# Patient Record
Sex: FEMALE | Race: WHITE | NOT HISPANIC OR LATINO | Employment: FULL TIME | URBAN - METROPOLITAN AREA
[De-identification: names, ages, dates, MRNs, and addresses within clinical notes are randomized per-mention and may not be internally consistent; named-entity substitution may affect disease eponyms.]

---

## 2018-12-05 ENCOUNTER — RECORDS - HEALTHEAST (OUTPATIENT)
Dept: LAB | Facility: HOSPITAL | Age: 58
End: 2018-12-05

## 2018-12-05 LAB
ANION GAP SERPL CALCULATED.3IONS-SCNC: 10 MMOL/L (ref 5–18)
BUN SERPL-MCNC: 26 MG/DL (ref 8–22)
CALCIUM SERPL-MCNC: 9.4 MG/DL (ref 8.5–10.5)
CHLORIDE BLD-SCNC: 107 MMOL/L (ref 98–107)
CO2 SERPL-SCNC: 25 MMOL/L (ref 22–31)
CREAT SERPL-MCNC: 1.2 MG/DL (ref 0.6–1.1)
GFR SERPL CREATININE-BSD FRML MDRD: 46 ML/MIN/1.73M2
GLUCOSE BLD-MCNC: 73 MG/DL (ref 70–125)
POTASSIUM BLD-SCNC: 3.7 MMOL/L (ref 3.5–5)
SODIUM SERPL-SCNC: 142 MMOL/L (ref 136–145)

## 2021-04-18 DIAGNOSIS — G43.009 MIGRAINE WITHOUT AURA AND RESPONSIVE TO TREATMENT: Primary | ICD-10-CM

## 2021-04-18 NOTE — LETTER
4/18/2021        RE: Lolly Manzano  [address] [place] [address]              Dear Lolly,        We recently provided you with medication refills.  Many medications require routine follow-up with your doctor.    Your prescription(s) have been refilled for 90 days so you may have time for the above noted follow-up. Please call to schedule soon so we can assure you have an appointment before your next refills are needed. If you have already made a follow up appointment, please disregard this letter.           Sincerely,        [facility] [place]     (Formerly known as [facility])  Dr. Coulter Care Team

## 2021-04-19 PROBLEM — G43.009 MIGRAINE WITHOUT AURA AND RESPONSIVE TO TREATMENT: Status: ACTIVE | Noted: 2021-04-19

## 2021-04-19 RX ORDER — TOPIRAMATE 100 MG/1
TABLET, FILM COATED ORAL
COMMUNITY
Start: 2020-05-03 | End: 2022-08-03

## 2021-04-19 RX ORDER — TOPIRAMATE 100 MG/1
TABLET, FILM COATED ORAL
Qty: 90 TABLET | Refills: 0 | Status: SHIPPED | OUTPATIENT
Start: 2021-04-19 | End: 2021-05-11

## 2021-04-19 NOTE — TELEPHONE ENCOUNTER
Dr. Coulter pt.  He is ut of the office until 4/26/21.  Can you refill in his absence?  Refill request for topiramate.  Pt due for follow up appt. No future appt scheduled.  Letter mailed to pt to remind to schedule.  Medication T'd for review and signature    Dena Hanson LPN on 4/19/2021 at 10:05 AM

## 2021-05-11 DIAGNOSIS — G43.009 MIGRAINE WITHOUT AURA AND RESPONSIVE TO TREATMENT: Primary | ICD-10-CM

## 2021-05-11 RX ORDER — TOPIRAMATE 100 MG/1
100 TABLET, FILM COATED ORAL AT BEDTIME
Qty: 90 TABLET | Refills: 1 | Status: SHIPPED | OUTPATIENT
Start: 2021-05-11 | End: 2022-03-29

## 2021-05-11 RX ORDER — ZOLMITRIPTAN 2.5 MG/1
TABLET, FILM COATED ORAL
COMMUNITY
Start: 2020-02-24 | End: 2021-05-11

## 2021-05-11 RX ORDER — ZOLMITRIPTAN 2.5 MG/1
TABLET, FILM COATED ORAL
Qty: 20 TABLET | Refills: 1 | Status: SHIPPED | OUTPATIENT
Start: 2021-05-11 | End: 2022-08-03

## 2021-05-11 NOTE — TELEPHONE ENCOUNTER
Pt called to request refills on Topamax through [facility] and Zomig at [facility] in [place]. She has a F/U with Dr Coulter on 8-23-21. Call if questions. [phone]

## 2021-05-11 NOTE — TELEPHONE ENCOUNTER
Refills requested for topamax and zomig.  Has upcoming appt on 8/23/21.  Medications T'd for review and signature    Dena Hanson LPN on 5/11/2021 at 2:12 PM

## 2021-05-29 ENCOUNTER — RECORDS - HEALTHEAST (OUTPATIENT)
Dept: ADMINISTRATIVE | Facility: CLINIC | Age: 61
End: 2021-05-29

## 2021-07-18 DIAGNOSIS — G43.009 MIGRAINE WITHOUT AURA AND RESPONSIVE TO TREATMENT: ICD-10-CM

## 2021-07-19 RX ORDER — TOPIRAMATE 100 MG/1
TABLET, FILM COATED ORAL
Qty: 90 TABLET | Refills: 0 | Status: SHIPPED | OUTPATIENT
Start: 2021-07-19 | End: 2021-10-18

## 2021-07-19 NOTE — TELEPHONE ENCOUNTER
Refill request for topiramate.  Pt has upcoming appt on 8/23/21.  Medication T'd for review and signature    Dena Hanson LPN on 7/19/2021 at 10:34 AM

## 2021-10-18 DIAGNOSIS — G43.009 MIGRAINE WITHOUT AURA AND RESPONSIVE TO TREATMENT: ICD-10-CM

## 2021-10-18 RX ORDER — TOPIRAMATE 100 MG/1
TABLET, FILM COATED ORAL
Qty: 90 TABLET | Refills: 1 | Status: SHIPPED | OUTPATIENT
Start: 2021-10-18 | End: 2023-10-03

## 2021-10-18 NOTE — TELEPHONE ENCOUNTER
Refill request for topiramate.  Pt has upcoming appt on 3/7/22.  Medication T'd for review and signature    Dena Hanson LPN on 10/18/2021 at 2:50 PM

## 2022-01-11 ENCOUNTER — TELEPHONE (OUTPATIENT)
Dept: NEUROLOGY | Facility: CLINIC | Age: 62
End: 2022-01-11
Payer: COMMERCIAL

## 2022-01-11 NOTE — TELEPHONE ENCOUNTER
Refill request for hydrochlorothiazide and Imitrex. Pt would like these sent to [facility]. Pt was never ordered hydrochlorothiazide or Imitrex by Dr. Coulter. Will deny this request. Pt should follow up with her PCP.     Deanna Dinh RN on 1/11/2022 at 9:49 AM

## 2022-01-11 NOTE — TELEPHONE ENCOUNTER
[facility] Call Center    Phone Message    May a detailed message be left on voicemail: yes     Reason for Call: Medication Refill Request    Has the patient contacted the pharmacy for the refill? Yes   Name of medication being requested: HYDROCHLOROTHIAZIDE 25 MG OR TABS, Imitrex 100mg  Provider who prescribed the medication: Dr. Coulter  Pharmacy: [facility]   Date medication is needed: ASAP     Patient is requesting for medication to be sent to new pharmacy [facility], patient is scheduled on 37/22 with Dr. Coulter, please call patient at [phone] to advise.      Action Taken: Other: MPNU    Travel Screening: Not Applicable

## 2022-03-29 ENCOUNTER — TELEPHONE (OUTPATIENT)
Dept: NEUROLOGY | Facility: CLINIC | Age: 62
End: 2022-03-29
Payer: COMMERCIAL

## 2022-03-29 DIAGNOSIS — G43.009 MIGRAINE WITHOUT AURA AND RESPONSIVE TO TREATMENT: ICD-10-CM

## 2022-03-29 RX ORDER — TOPIRAMATE 100 MG/1
100 TABLET, FILM COATED ORAL AT BEDTIME
Qty: 90 TABLET | Refills: 1 | Status: SHIPPED | OUTPATIENT
Start: 2022-03-29 | End: 2023-10-03

## 2022-03-29 NOTE — TELEPHONE ENCOUNTER
[facility] sent a refill request for Topiramate 100mg 1 tab at bedtime. Not clear which [facility]. Ph# [phone]   fax # [phone]

## 2022-08-03 ENCOUNTER — OFFICE VISIT (OUTPATIENT)
Dept: NEUROLOGY | Facility: CLINIC | Age: 62
End: 2022-08-03
Payer: COMMERCIAL

## 2022-08-03 VITALS
HEIGHT: 65 IN | HEART RATE: 78 BPM | DIASTOLIC BLOOD PRESSURE: 90 MMHG | SYSTOLIC BLOOD PRESSURE: 145 MMHG | WEIGHT: 173 LBS | BODY MASS INDEX: 28.82 KG/M2

## 2022-08-03 DIAGNOSIS — H81.13 BENIGN PAROXYSMAL POSITIONAL VERTIGO, BILATERAL: ICD-10-CM

## 2022-08-03 DIAGNOSIS — G43.009 MIGRAINE WITHOUT AURA AND RESPONSIVE TO TREATMENT: Primary | ICD-10-CM

## 2022-08-03 PROCEDURE — 99215 OFFICE O/P EST HI 40 MIN: CPT | Performed by: PSYCHIATRY & NEUROLOGY

## 2022-08-03 RX ORDER — ZOLMITRIPTAN 2.5 MG/1
TABLET, FILM COATED ORAL
Qty: 20 TABLET | Refills: 3 | Status: SHIPPED | OUTPATIENT
Start: 2022-08-03 | End: 2023-10-23

## 2022-08-03 RX ORDER — FLUTICASONE PROPIONATE 50 MCG
SPRAY, SUSPENSION (ML) NASAL
COMMUNITY
Start: 2021-04-01

## 2022-08-03 RX ORDER — LISINOPRIL 10 MG/1
1 TABLET ORAL DAILY
COMMUNITY
Start: 2021-09-27

## 2022-08-03 RX ORDER — SODIUM PHOSPHATE,MONO-DIBASIC 19G-7G/118
2000 ENEMA (ML) RECTAL
COMMUNITY

## 2022-08-03 RX ORDER — TOPIRAMATE 100 MG/1
TABLET, FILM COATED ORAL
Qty: 90 TABLET | Refills: 3 | Status: SHIPPED | OUTPATIENT
Start: 2022-08-03 | End: 2023-09-12

## 2022-08-03 NOTE — PROGRESS NOTES
In person evaluation    HPI  2/24/2020, in person evaluation  8/23/2021, no-show  8/3/2022, in person visit      62-year-old followed neurologically for:  Migraine headaches  Abortive and preventative medication    Last seen about 2-1/2 years ago  No major hospitalizations surgeries or major illnesses since        A.  Migraine headaches       Onset prior to 2003                                 2020                  8/3/2022       Frequency   0-2/month               3-4         Duration        all day               1/2 day         usually has to sleep them off         Associated symptoms       Photophobia       Some phonophobia       Headaches bitemporal in nature       Previously increased with oral contraceptives in the past       No significant nausea or vomiting         hard to take Imitrex during the day so we switched her to Zomig       Zomig 2.5 mg tablet tolerated well during the day but not as good at night         Will use 2 of the Zomig tablets if she has a headache at night          Neurologic history review       1. Migrainous type headaches going on for quite some time.       2. Had difficulty with weight gain with medications, so limited some of the preventatives.       3. Can have poor sleep and awakening, and with stressors can have increased headaches.       4. Does use Imitrex to break her headaches but it causes a weird sensation so she cannot use it when she is at work.            We switched  to Zomig, that works better.        B.  Paroxysmal vertigo        Worked up by ENT        History of sinus infection        History of Lyme's disease        History of hearing aids chronic hearing loss no tinnitus          has difficulty with vertigo when she gets up quickly or rolls over in bed quick lasting maybe 5 seconds        her  sometimes say that her eyes jiggle        she has had cannula 3 positioning in the past but only last for short time        sometimes will get motion sickness when  "she is on a boat but not all the time          Suspect that this is more inner ear type dysfunction but being on chronic meclizine may not be helpful could use it more as a as needed        discussed with patient      C.  Past history of some depression        In the past not on any antidepressants              Current past neurologic review:  1. Vascular type headaches going on for quite some time, even previously to .  2. History of paroxysmal vertigo, worked up by ENT, had sinus infections and \"Lyme s disease\" and has worn hearing aids.  3. Had an episode back in , was severely dehydrated, passed out in a canoe, had a seizure, taken to [facility], treated with fluids, had low blood pressure.  4. History of depression, not on any antidepressants.  5. History of arthritis and hypertension.    In the past, she has tried some:  a. Neurontin in  as high as 900 mg.  b. Tried nortriptyline 30 mg at night, worked for the headaches in , but had weight gain.  c. Switched to Topamax around , now on 100 mg once at nighttime seems to help.  d. Cannot use nonsteroidals antiinflammatories as she had a bleeding ulcer in the past.  e. In the past, had an MRI scan in  that showed some nonspecific T2 signal changes.    Past medical history  Migraine headaches  Vertigo  Lyme's disease   Depression  Syncope   Diverticulitis 2015    Habits  Non-smoker  Rare alcohol use    Family history  Mother with diverticulitis,  of ruptured colon/ at age 75  Father with stroke hemorrhagic/pneumonia  at age 83 also had some Alzheimer's  Sister with hypertension  Brother with hypertension  Brother with Hodgkin's lymphoma  Brother with colon cancer  Paternal grandmother with breast cancer  Son with mental retardation/epilepsy      Work-up  MRI scan brain 2001  A.  Multiple white matter changes bilaterally nonspecific question myelination  B.  No cerebellar pontine angle lesion  Past equivocal " Lyme's titer Western blot negative no  CSF 2001, negative for inflammation  EMG upper extremity 2003  A.  Right CTS moderate  B.  Left CTS moderate    Laboratory review                    12/2018           3/5/2021      8/23/2022  Topamax                                                  4.6    Na/K            142/3.7            138/4.2       141/4.0    Cl/CO2                                106/25        105/27    BUN/Cr         26/1.2             30/1.12       24/1.22    Glu                                       108               83    AST                                     25                 22    WBC/Hgb                        6.9/13.9         6.8/14.8    Plts                                  320,000         290,000      Laboratory data 8/23/2022  Sodium 141 potassium 4.0, BUN 24 creatinine 1.22, glucose 83  AST 22  White blood count 6.8, hemoglobin 14.8, platelets 290,000  Topamax level 4.6        Exam    Review of systems pertinent positives and negatives  Headaches as above    Vertigo as above  Chronic hearing loss no tinnitus    No diplopia dysarthria dysphagia  No focal weakness numbness or tingling  No visual field cut    No chest pain no shortness of breath  No nausea vomiting  No diarrhea fever chills    Otherwise review of systems negative        General exam  Blood pressure 145/90, pulse 78  HEENT exam is normal except hearing loss which is chronic  Lungs clear  Heart rate regular  Abdomen soft  Symmetrical pulses no edema in the feet    Neurologic exam  Alert oriented x3  Normal prosody speech  Normal naming  Normal comprehension  Normal repetition  No aphasia  No neglect  Memory recall good      Cranials 2 through 12  No ophthalmoplegia  No nystagmus  Visual fields intact  Face symmetrical  Twisters good  Chronic hearing loss    Upper extremities  No drift no tremor normal finger-nose    Lower extremities  Distal proximal strength good    Reflexes  Symmetrical decreased at the ankles but  symmetrical  toes downgoing    Gait  Normal        Assessment/Plan     1.  Migraine (G43.709)        Continue Topamax 100 mg nightly        Zomig as a abortive therapy 2.5 mg tablet better tolerated than the Imitrex, can take 2 tablets at nighttime to help break more severe headache       Good headache hygiene rediscussed proper sleep cycle hydration    Discussed risk factors of medication including but not limited to kidney stones cognitive difficulties paresthesias glaucoma    2.  Vertigo (R42)        reviewed difficulty with inner ear dysfunction with relatively persistent low-grade vertigo with movement       Does not get lasting relief with position/cannula treatment       Could use meclizine as needed        3.  CTS       Bilateral EMG 2003 moderate in degree    Current plan:    1. Migrainous type headaches going on at least since 2003.  2. Better with Topamax 100 mg once at nighttime.  3. Knows the difference between preventative and abortive therapies, we discussed triptans, switched to Zomig.  4. Use Zomig 2.5 mg tablet, she can use two tablets if she is at home, one tablet if she is at work and see if she can use that without causing difficulty.  5. Knows the risks and side effects of Topamax including but not limited to kidney stones, paresthesias, cognitive difficulties, birth defects.  6. Talked about good headache hygiene.        Refilled Zomig  Refilled Topamax    Check  Electrolytes  AST  CBC  Topamax level    Follow-up on a yearly basis    48 minutes total care time today discussing and evaluating the above    Addendum 8/24/2022  Laboratory data 8/23/2022  Sodium 141 potassium 4.0, BUN 24 creatinine 1.22, glucose 83  AST 22  White blood count 6.8, hemoglobin 14.8, platelets 290,000  Topamax level 4.6

## 2022-08-03 NOTE — NURSING NOTE
Chief Complaint   Patient presents with     Headache     Pt states she is not having migraines.She still does have occasional headaches though. She would sunita to have a rx for sumatriptan tablets to have prn     Dena Hanson LPN on 8/3/2022 at 12:02 PM

## 2022-08-03 NOTE — LETTER
August 24, 2022      Lolly Manzano  [address] [place] [address]        Dear ,    We are writing to inform you of your test results.    Laboratory data 8/23/2022  Sodium 141 potassium 4.0, BUN 24 creatinine 1.22, glucose 83  AST 22  White blood count 6.8, hemoglobin 14.8, platelets 290,000  Topamax level 4.6  Your labs are stable continue as planned  If you have any questions or concerns, please call the clinic at the number listed above.       Sincerely,    Dr. Manan Coulter

## 2022-08-23 ENCOUNTER — LAB (OUTPATIENT)
Dept: LAB | Facility: HOSPITAL | Age: 62
End: 2022-08-23
Payer: COMMERCIAL

## 2022-08-23 DIAGNOSIS — G43.009 MIGRAINE WITHOUT AURA AND RESPONSIVE TO TREATMENT: ICD-10-CM

## 2022-08-23 LAB
ANION GAP SERPL CALCULATED.3IONS-SCNC: 9 MMOL/L (ref 5–18)
AST SERPL W P-5'-P-CCNC: 22 U/L (ref 0–40)
BUN SERPL-MCNC: 24 MG/DL (ref 8–22)
CALCIUM SERPL-MCNC: 9.9 MG/DL (ref 8.5–10.5)
CHLORIDE BLD-SCNC: 105 MMOL/L (ref 98–107)
CO2 SERPL-SCNC: 27 MMOL/L (ref 22–31)
CREAT SERPL-MCNC: 1.22 MG/DL (ref 0.6–1.1)
ERYTHROCYTE [DISTWIDTH] IN BLOOD BY AUTOMATED COUNT: 12 % (ref 10–15)
GFR SERPL CREATININE-BSD FRML MDRD: 50 ML/MIN/1.73M2
GLUCOSE BLD-MCNC: 83 MG/DL (ref 70–125)
HCT VFR BLD AUTO: 44.6 % (ref 35–47)
HGB BLD-MCNC: 14.8 G/DL (ref 11.7–15.7)
MCH RBC QN AUTO: 30.9 PG (ref 26.5–33)
MCHC RBC AUTO-ENTMCNC: 33.2 G/DL (ref 31.5–36.5)
MCV RBC AUTO: 93 FL (ref 78–100)
PLATELET # BLD AUTO: 290 10E3/UL (ref 150–450)
POTASSIUM BLD-SCNC: 4 MMOL/L (ref 3.5–5)
RBC # BLD AUTO: 4.79 10E6/UL (ref 3.8–5.2)
SODIUM SERPL-SCNC: 141 MMOL/L (ref 136–145)
WBC # BLD AUTO: 6.8 10E3/UL (ref 4–11)

## 2022-08-23 PROCEDURE — 80201 ASSAY OF TOPIRAMATE: CPT

## 2022-08-23 PROCEDURE — 85027 COMPLETE CBC AUTOMATED: CPT

## 2022-08-23 PROCEDURE — 80048 BASIC METABOLIC PNL TOTAL CA: CPT

## 2022-08-23 PROCEDURE — 84450 TRANSFERASE (AST) (SGOT): CPT

## 2022-08-23 PROCEDURE — 36415 COLL VENOUS BLD VENIPUNCTURE: CPT

## 2022-08-24 LAB — TOPIRAMATE SERPL-MCNC: 4.6 UG/ML

## 2022-11-20 ENCOUNTER — HEALTH MAINTENANCE LETTER (OUTPATIENT)
Age: 62
End: 2022-11-20

## 2023-09-12 DIAGNOSIS — G43.009 MIGRAINE WITHOUT AURA AND RESPONSIVE TO TREATMENT: ICD-10-CM

## 2023-09-12 RX ORDER — TOPIRAMATE 100 MG/1
TABLET, FILM COATED ORAL
Qty: 30 TABLET | Refills: 0 | Status: SHIPPED | OUTPATIENT
Start: 2023-09-12 | End: 2023-10-03

## 2023-09-12 NOTE — TELEPHONE ENCOUNTER
Refill request for: topiramate 100mg  Directions: Take one tablet at bedtime    LOV: 08/03/22  NOV: No future appt scheduled. Pt cancelled appt on 8/17/23, not rescheduled. Letter mailed to pt to remind her to schedule an appt.    30 day supply with 0 refills Medication T'd for review and signature    Dena Hanson LPN on 9/12/2023 at 10:41 AM

## 2023-09-12 NOTE — LETTER
9/12/2023        RE: Lolly Manzano  [address] [place] [address]            Dear Lolly,           We recently provided you with medication refills.  Many medications require routine follow-up with your doctor.    Your prescription(s) have been refilled for 30 days so you may have time for the above noted follow-up. Please call to schedule soon so we can assure you have an appointment before your next refills are needed. If you have already made a follow up appointment, please disregard this letter.           Sincerely,        [facility] [place]     (Formerly known as [facility])  Dr. Coulter Care Team

## 2023-10-02 ENCOUNTER — APPOINTMENT (OUTPATIENT)
Dept: CT IMAGING | Facility: HOSPITAL | Age: 63
End: 2023-10-02
Attending: STUDENT IN AN ORGANIZED HEALTH CARE EDUCATION/TRAINING PROGRAM
Payer: COMMERCIAL

## 2023-10-02 ENCOUNTER — HOSPITAL ENCOUNTER (INPATIENT)
Facility: HOSPITAL | Age: 63
Setting detail: SURGERY ADMIT
End: 2023-10-02
Attending: SURGERY | Admitting: SURGERY
Payer: COMMERCIAL

## 2023-10-02 ENCOUNTER — HOSPITAL ENCOUNTER (INPATIENT)
Facility: HOSPITAL | Age: 63
LOS: 1 days | Discharge: HOME | End: 2023-10-03
Attending: STUDENT IN AN ORGANIZED HEALTH CARE EDUCATION/TRAINING PROGRAM | Admitting: SURGERY
Payer: COMMERCIAL

## 2023-10-02 DIAGNOSIS — K35.80 ACUTE APPENDICITIS, UNSPECIFIED ACUTE APPENDICITIS TYPE: ICD-10-CM

## 2023-10-02 DIAGNOSIS — N28.9 RENAL LESION: ICD-10-CM

## 2023-10-02 LAB
ALBUMIN SERPL BCG-MCNC: 4.6 G/DL (ref 3.5–5.2)
ALBUMIN UR-MCNC: NEGATIVE MG/DL
ALP SERPL-CCNC: 56 U/L (ref 35–104)
ALT SERPL W P-5'-P-CCNC: 19 U/L (ref 0–50)
ANION GAP SERPL CALCULATED.3IONS-SCNC: 14 MMOL/L (ref 7–15)
APPEARANCE UR: CLEAR
AST SERPL W P-5'-P-CCNC: 19 U/L (ref 0–45)
BASO+EOS+MONOS # BLD AUTO: ABNORMAL 10*3/UL
BASO+EOS+MONOS NFR BLD AUTO: ABNORMAL %
BASOPHILS # BLD AUTO: 0.1 10E3/UL (ref 0–0.2)
BASOPHILS NFR BLD AUTO: 1 %
BILIRUB SERPL-MCNC: 0.4 MG/DL
BILIRUB UR QL STRIP: NEGATIVE
BUN SERPL-MCNC: 23.2 MG/DL (ref 8–23)
CALCIUM SERPL-MCNC: 10.4 MG/DL (ref 8.8–10.2)
CHLORIDE SERPL-SCNC: 102 MMOL/L (ref 98–107)
COLOR UR AUTO: ABNORMAL
CREAT SERPL-MCNC: 0.98 MG/DL (ref 0.51–0.95)
DEPRECATED HCO3 PLAS-SCNC: 21 MMOL/L (ref 22–29)
EGFRCR SERPLBLD CKD-EPI 2021: 65 ML/MIN/1.73M2
EOSINOPHIL # BLD AUTO: 0.2 10E3/UL (ref 0–0.7)
EOSINOPHIL NFR BLD AUTO: 1 %
ERYTHROCYTE [DISTWIDTH] IN BLOOD BY AUTOMATED COUNT: 12.9 % (ref 10–15)
GLUCOSE SERPL-MCNC: 96 MG/DL (ref 70–99)
GLUCOSE UR STRIP-MCNC: NEGATIVE MG/DL
HCT VFR BLD AUTO: 42.3 % (ref 35–47)
HGB BLD-MCNC: 13.7 G/DL (ref 11.7–15.7)
HGB UR QL STRIP: NEGATIVE
HYALINE CASTS: 1 /LPF
IMM GRANULOCYTES # BLD: 0.1 10E3/UL
IMM GRANULOCYTES NFR BLD: 0 %
KETONES UR STRIP-MCNC: NEGATIVE MG/DL
LEUKOCYTE ESTERASE UR QL STRIP: NEGATIVE
LYMPHOCYTES # BLD AUTO: 3.3 10E3/UL (ref 0.8–5.3)
LYMPHOCYTES NFR BLD AUTO: 23 %
MCH RBC QN AUTO: 30.1 PG (ref 26.5–33)
MCHC RBC AUTO-ENTMCNC: 32.4 G/DL (ref 31.5–36.5)
MCV RBC AUTO: 93 FL (ref 78–100)
MONOCYTES # BLD AUTO: 0.9 10E3/UL (ref 0–1.3)
MONOCYTES NFR BLD AUTO: 6 %
MUCOUS THREADS #/AREA URNS LPF: PRESENT /LPF
NEUTROPHILS # BLD AUTO: 9.8 10E3/UL (ref 1.6–8.3)
NEUTROPHILS NFR BLD AUTO: 69 %
NITRATE UR QL: NEGATIVE
NRBC # BLD AUTO: 0 10E3/UL
NRBC BLD AUTO-RTO: 0 /100
PH UR STRIP: 5 [PH] (ref 5–7)
PLATELET # BLD AUTO: 311 10E3/UL (ref 150–450)
POTASSIUM SERPL-SCNC: 4 MMOL/L (ref 3.4–5.3)
PROT SERPL-MCNC: 7.6 G/DL (ref 6.4–8.3)
RBC # BLD AUTO: 4.55 10E6/UL (ref 3.8–5.2)
RBC URINE: <1 /HPF
SODIUM SERPL-SCNC: 137 MMOL/L (ref 135–145)
SP GR UR STRIP: 1.01 (ref 1–1.03)
UROBILINOGEN UR STRIP-MCNC: <2 MG/DL
WBC # BLD AUTO: 14.4 10E3/UL (ref 4–11)
WBC URINE: 1 /HPF

## 2023-10-02 PROCEDURE — 96367 TX/PROPH/DG ADDL SEQ IV INF: CPT

## 2023-10-02 PROCEDURE — 80053 COMPREHEN METABOLIC PANEL: CPT | Performed by: STUDENT IN AN ORGANIZED HEALTH CARE EDUCATION/TRAINING PROGRAM

## 2023-10-02 PROCEDURE — 250N000013 HC RX MED GY IP 250 OP 250 PS 637: Performed by: STUDENT IN AN ORGANIZED HEALTH CARE EDUCATION/TRAINING PROGRAM

## 2023-10-02 PROCEDURE — 250N000011 HC RX IP 250 OP 636: Mod: JZ | Performed by: STUDENT IN AN ORGANIZED HEALTH CARE EDUCATION/TRAINING PROGRAM

## 2023-10-02 PROCEDURE — 85025 COMPLETE CBC W/AUTO DIFF WBC: CPT | Performed by: STUDENT IN AN ORGANIZED HEALTH CARE EDUCATION/TRAINING PROGRAM

## 2023-10-02 PROCEDURE — 36415 COLL VENOUS BLD VENIPUNCTURE: CPT | Performed by: STUDENT IN AN ORGANIZED HEALTH CARE EDUCATION/TRAINING PROGRAM

## 2023-10-02 PROCEDURE — 96361 HYDRATE IV INFUSION ADD-ON: CPT

## 2023-10-02 PROCEDURE — 96375 TX/PRO/DX INJ NEW DRUG ADDON: CPT

## 2023-10-02 PROCEDURE — 99203 OFFICE O/P NEW LOW 30 MIN: CPT | Mod: 57 | Performed by: SURGERY

## 2023-10-02 PROCEDURE — 81001 URINALYSIS AUTO W/SCOPE: CPT | Performed by: STUDENT IN AN ORGANIZED HEALTH CARE EDUCATION/TRAINING PROGRAM

## 2023-10-02 PROCEDURE — 258N000003 HC RX IP 258 OP 636: Performed by: STUDENT IN AN ORGANIZED HEALTH CARE EDUCATION/TRAINING PROGRAM

## 2023-10-02 PROCEDURE — 99285 EMERGENCY DEPT VISIT HI MDM: CPT | Mod: 25

## 2023-10-02 PROCEDURE — 74177 CT ABD & PELVIS W/CONTRAST: CPT

## 2023-10-02 PROCEDURE — 96365 THER/PROPH/DIAG IV INF INIT: CPT | Mod: 59

## 2023-10-02 RX ORDER — HYDROMORPHONE HYDROCHLORIDE 1 MG/ML
0.3 INJECTION, SOLUTION INTRAMUSCULAR; INTRAVENOUS; SUBCUTANEOUS
Status: DISCONTINUED | OUTPATIENT
Start: 2023-10-02 | End: 2023-10-03 | Stop reason: HOSPADM

## 2023-10-02 RX ORDER — SODIUM CHLORIDE 9 MG/ML
INJECTION, SOLUTION INTRAVENOUS ONCE
Status: DISCONTINUED | OUTPATIENT
Start: 2023-10-02 | End: 2023-10-02

## 2023-10-02 RX ORDER — IOPAMIDOL 755 MG/ML
75 INJECTION, SOLUTION INTRAVASCULAR ONCE
Status: COMPLETED | OUTPATIENT
Start: 2023-10-02 | End: 2023-10-02

## 2023-10-02 RX ORDER — TOPIRAMATE 100 MG/1
100 TABLET, FILM COATED ORAL ONCE
Status: COMPLETED | OUTPATIENT
Start: 2023-10-02 | End: 2023-10-02

## 2023-10-02 RX ORDER — LISINOPRIL 5 MG/1
10 TABLET ORAL DAILY
Status: DISCONTINUED | OUTPATIENT
Start: 2023-10-03 | End: 2023-10-02

## 2023-10-02 RX ORDER — CEFTRIAXONE 1 G/1
1 INJECTION, POWDER, FOR SOLUTION INTRAMUSCULAR; INTRAVENOUS ONCE
Status: COMPLETED | OUTPATIENT
Start: 2023-10-02 | End: 2023-10-02

## 2023-10-02 RX ORDER — LISINOPRIL 5 MG/1
10 TABLET ORAL DAILY
Status: DISCONTINUED | OUTPATIENT
Start: 2023-10-02 | End: 2023-10-03 | Stop reason: HOSPADM

## 2023-10-02 RX ORDER — SODIUM CHLORIDE 9 MG/ML
INJECTION, SOLUTION INTRAVENOUS CONTINUOUS
Status: DISCONTINUED | OUTPATIENT
Start: 2023-10-02 | End: 2023-10-03 | Stop reason: HOSPADM

## 2023-10-02 RX ORDER — METRONIDAZOLE 500 MG/100ML
500 INJECTION, SOLUTION INTRAVENOUS ONCE
Status: COMPLETED | OUTPATIENT
Start: 2023-10-02 | End: 2023-10-02

## 2023-10-02 RX ADMIN — TOPIRAMATE 100 MG: 100 TABLET, FILM COATED ORAL at 21:33

## 2023-10-02 RX ADMIN — LISINOPRIL 10 MG: 5 TABLET ORAL at 21:33

## 2023-10-02 RX ADMIN — IOPAMIDOL 75 ML: 755 INJECTION, SOLUTION INTRAVENOUS at 18:43

## 2023-10-02 RX ADMIN — SODIUM CHLORIDE: 9 INJECTION, SOLUTION INTRAVENOUS at 22:07

## 2023-10-02 RX ADMIN — CEFTRIAXONE SODIUM 1 G: 1 INJECTION, POWDER, FOR SOLUTION INTRAMUSCULAR; INTRAVENOUS at 20:20

## 2023-10-02 RX ADMIN — HYDROMORPHONE HYDROCHLORIDE 0.3 MG: 1 INJECTION, SOLUTION INTRAMUSCULAR; INTRAVENOUS; SUBCUTANEOUS at 20:53

## 2023-10-02 RX ADMIN — METRONIDAZOLE 500 MG: 500 INJECTION, SOLUTION INTRAVENOUS at 20:56

## 2023-10-02 ASSESSMENT — ACTIVITIES OF DAILY LIVING (ADL)
ADLS_ACUITY_SCORE: 35
ADLS_ACUITY_SCORE: 35

## 2023-10-02 NOTE — ED PROVIDER NOTES
NAME: Lolly Manzano  AGE: 63 year old female  YOB: 1960  MRN: [id number]  EVALUATION DATE & TIME: No admission date for patient encounter.    PCP: Mau Garber (Inactive)  ED PROVIDER: Barbara Abraham MD.    Chief Complaint   Patient presents with    Abdominal Pain       FINAL IMPRESSION:  1. Acute appendicitis, unspecified acute appendicitis type    2. Renal lesion        MEDICAL DECISION MAKIN:01 PM I met with the patient, obtained history, performed an initial exam, and discussed options and plan for diagnostics and treatment here in the ED.  8:10 PM I spoke to Dr. Banks, general surgery    MDM: 62 y/o F who presents with one day of worsening RLQ pain. She is afebrile with generally reassuring vitals on exam. Dx includes but not limited to ulcer, pancreatitis, appendicitis, cholelithiasis, cholecystitis, diverticulitis, PID, ovarian torsion (unlikely reports ovary removal on that side), hernia, UTI, kidney stone, AAA among others.    Labs shows WBC 14.4. Mild hypercalcemia. UA does not appear infected.  CT abd/pelvis shows appendicitis, there is also an 8 mm right renal lesion that I informed patient of and this will need follow up. I placed a urology referral for this as well.    Discussed with general surgery, will keep NPO, surgery timing unclear at this point. Plan to discharge after OR, okay not involving hospitalist at this point.    Patient started on ceftriaxone and flagyl given penicillin allergy and patient okay trying ceftriaxone.   PRN pain medication ordered  Signed out to oncoming ED provider pending OR    Medical Decision Making    History:  Supplemental history from: N/A  External Record(s) reviewed: Documented in chart, if applicable.    Work Up:  Chart documentation includes differential considered and any EKGs or imaging interpreted by provider.  In additional to work up documented, I considered the following work up: Documented in chart, if  applicable.    External consultation:  Discussion of management with another provider: General Surgery and Hospitalist    Complicating factors:  Care impacted by chronic illness: Chronic Kidney Disease, Hypertension, and Mental Health  Care affected by social determinants of health: Access to Medical Care    Disposition considerations: Admit.      MEDICATIONS GIVEN IN THE EMERGENCY:  Medications   cefTRIAXone (ROCEPHIN) 1 g vial to attach to  mL bag for ADULTS or NS 50 mL bag for PEDS (1 g Intravenous $New Bag 10/2/23 2020)   metroNIDAZOLE (FLAGYL) infusion 500 mg (has no administration in time range)   HYDROmorphone (PF) (DILAUDID) injection 0.3 mg (has no administration in time range)   iopamidol (ISOVUE-370) solution 75 mL (75 mLs Intravenous $Given 10/2/23 1843)       NEW PRESCRIPTIONS STARTED AT TODAY'S ER VISIT:  New Prescriptions    No medications on file        =================================================================  HPI    Patient information was obtained from: Patient  Use of : N/A       Lolly Manzano is a 63 year old female with a past medical history of HTN, recurrent diverticulitis, CKD stage 3, and depression, who presents to the ED by private car for evaluation of abdominal pain.    Patient reports an acute onset of RLQ abdominal pain, beginning earlier today around 0930. Pain is progressive in nature, but provoked with walking and improved with rest. Also complains of nausea without emesis. Applied heat pad with no relief, and has not taken any medications prior to arrival.    Chronically, she complains of bowel issues, which has been well-managed with medications. No acute changes in this respect.    Of note, patient has history of diverticulitis, so she was given home antibiotics, no hospitalization needed. No recent long distance travel. No recent illnesses.     Denies fever, chills, or changes in appetite.      REVIEW OF SYSTEMS   All systems reviewed, please see  HPI for pertinent findings.    PAST MEDICAL HISTORY:  History reviewed. No pertinent past medical history.    PAST SURGICAL HISTORY:  History reviewed. No pertinent surgical history.    CURRENT MEDICATIONS:      Current Facility-Administered Medications:     cefTRIAXone (ROCEPHIN) 1 g vial to attach to  mL bag for ADULTS or NS 50 mL bag for PEDS, 1 g, Intravenous, Once, Barbara Abraham MD, 1 g at 10/02/23 2020    HYDROmorphone (PF) (DILAUDID) injection 0.3 mg, 0.3 mg, Intravenous, Q3H PRN, Barbara Abraham MD    metroNIDAZOLE (FLAGYL) infusion 500 mg, 500 mg, Intravenous, Once, Barbara Abraham MD    Current Outpatient Medications:     CALCIUM + D OR, unsure of dose daily, Disp: , Rfl:     fluticasone (FLONASE) 50 MCG/ACT nasal spray, , Disp: , Rfl:     glucosamine 500 MG CAPS capsule, Take 2,000 mg by mouth, Disp: , Rfl:     lisinopril (ZESTRIL) 10 MG tablet, Take 1 tablet by mouth daily, Disp: , Rfl:     MAGNESIUM CITRATE OR, unsure of dose daily, Disp: , Rfl:     ORDER FOR DME, R wrist splint, Disp: 1 , Rfl: 0    topiramate (TOPAMAX) 100 MG tablet, TAKE 1 TABLET AT BEDTIME DUE FOR APPT WITH DR ALBERT, Disp: 30 tablet, Rfl: 0    topiramate (TOPAMAX) 100 MG tablet, Take 1 tablet (100 mg) by mouth At Bedtime, Disp: 90 tablet, Rfl: 1    topiramate (TOPAMAX) 100 MG tablet, TAKE 1 TABLET AT BEDTIME, Disp: 90 tablet, Rfl: 1    ZOLMitriptan (ZOMIG) 2.5 MG tablet, Take 1 tab(s) PO Once per day prn migraine, Disp: 20 tablet, Rfl: 3    ALLERGIES:  Allergies   Allergen Reactions    Nuts Hives    Estrogens      Weight loss      Influenza Vaccine Live Other (See Comments)     Patient states she became very ill with flu vaccine    Pcn [Penicillins] Hives    Sulfa Antibiotics Rash       FAMILY HISTORY:  Family History   Problem Relation Age of Onset    Hypertension Mother     Myocardial Infarction Mother     Alzheimer Disease Father     Cerebrovascular Disease Father     Depression Sister     Leukemia Brother     Seizure  Disorder Son        SOCIAL HISTORY:   Social History     Socioeconomic History    Marital status:    Tobacco Use    Smoking status: Former    Smokeless tobacco: Never   Substance and Sexual Activity    Alcohol use: Not Currently       PHYSICAL EXAM:    Vitals: BP (!) 150/81   Pulse 71   Temp 98  F (36.7  C) (Temporal)   Resp 17   Wt 73 kg (161 lb)   SpO2 98%   BMI 26.79 kg/m     Constitutional: Well developed, well nourished. No acute distress.  HENT: Normocephalic, atraumatic, mucous membranes moist. Neck-gross ROM intact.   Eyes: Pupils mid-range, sclera white, no discharge  Respiratory: no respiratory distress, speaks full sentences easily.  Cardiovascular: Normal heart rate, regular rhythm  GI: Soft, not distended, RLQ tenderness   Musculoskeletal: Moving all 4 extremities intentionally and without pain. No obvious deformity.  Skin: Warm, dry  Neurologic: Alert & oriented, speech clear, Cns grossly intact, no focal deficits noted     LAB:  All pertinent labs reviewed and interpreted.  Labs Ordered and Resulted from Time of ED Arrival to Time of ED Departure   COMPREHENSIVE METABOLIC PANEL - Abnormal       Result Value    Sodium 137      Potassium 4.0      Carbon Dioxide (CO2) 21 (*)     Anion Gap 14      Urea Nitrogen 23.2 (*)     Creatinine 0.98 (*)     GFR Estimate 65      Calcium 10.4 (*)     Chloride 102      Glucose 96      Alkaline Phosphatase 56      AST 19      ALT 19      Protein Total 7.6      Albumin 4.6      Bilirubin Total 0.4     ROUTINE UA WITH MICROSCOPIC REFLEX TO CULTURE - Abnormal    Color Urine Light Yellow      Appearance Urine Clear      Glucose Urine Negative      Bilirubin Urine Negative      Ketones Urine Negative      Specific Gravity Urine 1.011      Blood Urine Negative      pH Urine 5.0      Protein Albumin Urine Negative      Urobilinogen Urine <2.0      Nitrite Urine Negative      Leukocyte Esterase Urine Negative      Mucus Urine Present (*)     RBC Urine <1      WBC  Urine 1      Hyaline Casts Urine 1     CBC WITH PLATELETS AND DIFFERENTIAL - Abnormal    WBC Count 14.4 (*)     RBC Count 4.55      Hemoglobin 13.7      Hematocrit 42.3      MCV 93      MCH 30.1      MCHC 32.4      RDW 12.9      Platelet Count 311      % Neutrophils 69      % Lymphocytes 23      % Monocytes 6      Mids % (Monos, Eos, Basos)        % Eosinophils 1      % Basophils 1      % Immature Granulocytes 0      NRBCs per 100 WBC 0      Absolute Neutrophils 9.8 (*)     Absolute Lymphocytes 3.3      Absolute Monocytes 0.9      Mids Abs (Monos, Eos, Basos)        Absolute Eosinophils 0.2      Absolute Basophils 0.1      Absolute Immature Granulocytes 0.1      Absolute NRBCs 0.0         RADIOLOGY:  CT Abdomen Pelvis w Contrast   Final Result   IMPRESSION:    1.  Acute appendicitis. No perforation or abscess.   2.  An 8 mm right renal lesion does not meet criteria for a simple cyst. Renal mass protocol CT or MRI is recommended for further evaluation.              I, Donovan Hamlin, am serving as a scribe to document services personally performed by Dr. Barbara Abraham based on my observation and the provider's statements to me. I, Barbara Abraham MD attest that Donovan Hamlin is acting in a scribe capacity, has observed my performance of the services and has documented them in accordance with my direction.    Barbara Abraham M.D.  Emergency Medicine  [facility] EMERGENCY DEPARTMENT  [facility] [facility] [address] [phone]  Dept: [phone]     Barbara Abraham MD  10/02/23 2031       Barbara Abraham MD  10/02/23 2031

## 2023-10-02 NOTE — LETTER
[address] [address]  Phone: [phone]  Fax: [phone]    October 3, 2023        Lolly Manzano  [address] [place] [address]          To whom it may concern:    RE: Lolly Manzano    This patient had surgery on 10/3/2023.  She will require 1 week off from work to allow for her recovery.  She will then have a 25 pound lifting restriction for 4 weeks after the date of surgery.  Please make the appropriate accommodations to allow for her recovery.    Please contact me for questions or concerns.      Sincerely,        Yayo Barahona MD  General Surgeon  [facility]  Surgery Clinic - [address] [address]  Office: [phone]

## 2023-10-03 ENCOUNTER — ANESTHESIA EVENT (OUTPATIENT)
Dept: SURGERY | Facility: HOSPITAL | Age: 63
End: 2023-10-03
Payer: COMMERCIAL

## 2023-10-03 ENCOUNTER — ANESTHESIA (OUTPATIENT)
Dept: SURGERY | Facility: HOSPITAL | Age: 63
End: 2023-10-03
Payer: COMMERCIAL

## 2023-10-03 VITALS
WEIGHT: 161 LBS | TEMPERATURE: 97.7 F | OXYGEN SATURATION: 95 % | HEART RATE: 65 BPM | DIASTOLIC BLOOD PRESSURE: 89 MMHG | SYSTOLIC BLOOD PRESSURE: 161 MMHG | BODY MASS INDEX: 26.79 KG/M2 | RESPIRATION RATE: 20 BRPM

## 2023-10-03 PROBLEM — K35.80 ACUTE APPENDICITIS, UNSPECIFIED ACUTE APPENDICITIS TYPE: Status: ACTIVE | Noted: 2023-10-03

## 2023-10-03 PROBLEM — N28.9 RENAL LESION: Status: ACTIVE | Noted: 2023-10-03

## 2023-10-03 LAB
ANION GAP SERPL CALCULATED.3IONS-SCNC: 11 MMOL/L (ref 7–15)
BUN SERPL-MCNC: 21 MG/DL (ref 8–23)
CALCIUM SERPL-MCNC: 8.8 MG/DL (ref 8.8–10.2)
CHLORIDE SERPL-SCNC: 105 MMOL/L (ref 98–107)
CREAT SERPL-MCNC: 1.06 MG/DL (ref 0.51–0.95)
DEPRECATED HCO3 PLAS-SCNC: 22 MMOL/L (ref 22–29)
EGFRCR SERPLBLD CKD-EPI 2021: 59 ML/MIN/1.73M2
ERYTHROCYTE [DISTWIDTH] IN BLOOD BY AUTOMATED COUNT: 13.1 % (ref 10–15)
GLUCOSE SERPL-MCNC: 94 MG/DL (ref 70–99)
HCT VFR BLD AUTO: 40.2 % (ref 35–47)
HGB BLD-MCNC: 13 G/DL (ref 11.7–15.7)
MAGNESIUM SERPL-MCNC: 2 MG/DL (ref 1.7–2.3)
MCH RBC QN AUTO: 30.7 PG (ref 26.5–33)
MCHC RBC AUTO-ENTMCNC: 32.3 G/DL (ref 31.5–36.5)
MCV RBC AUTO: 95 FL (ref 78–100)
PLATELET # BLD AUTO: 265 10E3/UL (ref 150–450)
POTASSIUM SERPL-SCNC: 4.1 MMOL/L (ref 3.4–5.3)
RBC # BLD AUTO: 4.24 10E6/UL (ref 3.8–5.2)
SODIUM SERPL-SCNC: 138 MMOL/L (ref 135–145)
WBC # BLD AUTO: 8.2 10E3/UL (ref 4–11)

## 2023-10-03 PROCEDURE — 710N000012 HC RECOVERY PHASE 2, PER MINUTE: Performed by: SURGERY

## 2023-10-03 PROCEDURE — 83735 ASSAY OF MAGNESIUM: CPT | Performed by: INTERNAL MEDICINE

## 2023-10-03 PROCEDURE — 258N000003 HC RX IP 258 OP 636: Performed by: ANESTHESIOLOGY

## 2023-10-03 PROCEDURE — 36415 COLL VENOUS BLD VENIPUNCTURE: CPT | Performed by: STUDENT IN AN ORGANIZED HEALTH CARE EDUCATION/TRAINING PROGRAM

## 2023-10-03 PROCEDURE — 250N000011 HC RX IP 250 OP 636: Mod: JZ | Performed by: STUDENT IN AN ORGANIZED HEALTH CARE EDUCATION/TRAINING PROGRAM

## 2023-10-03 PROCEDURE — 88304 TISSUE EXAM BY PATHOLOGIST: CPT | Mod: 26 | Performed by: PATHOLOGY

## 2023-10-03 PROCEDURE — 96376 TX/PRO/DX INJ SAME DRUG ADON: CPT

## 2023-10-03 PROCEDURE — 85027 COMPLETE CBC AUTOMATED: CPT | Performed by: STUDENT IN AN ORGANIZED HEALTH CARE EDUCATION/TRAINING PROGRAM

## 2023-10-03 PROCEDURE — 272N000001 HC OR GENERAL SUPPLY STERILE: Performed by: SURGERY

## 2023-10-03 PROCEDURE — 96375 TX/PRO/DX INJ NEW DRUG ADDON: CPT

## 2023-10-03 PROCEDURE — 80048 BASIC METABOLIC PNL TOTAL CA: CPT | Performed by: INTERNAL MEDICINE

## 2023-10-03 PROCEDURE — 0DTJ4ZZ RESECTION OF APPENDIX, PERCUTANEOUS ENDOSCOPIC APPROACH: ICD-10-PCS | Performed by: SURGERY

## 2023-10-03 PROCEDURE — 99207 PR APP CREDIT; MD BILLING SHARED VISIT: CPT | Performed by: INTERNAL MEDICINE

## 2023-10-03 PROCEDURE — 360N000076 HC SURGERY LEVEL 3, PER MIN: Performed by: SURGERY

## 2023-10-03 PROCEDURE — 96361 HYDRATE IV INFUSION ADD-ON: CPT

## 2023-10-03 PROCEDURE — 250N000013 HC RX MED GY IP 250 OP 250 PS 637: Performed by: STUDENT IN AN ORGANIZED HEALTH CARE EDUCATION/TRAINING PROGRAM

## 2023-10-03 PROCEDURE — 250N000013 HC RX MED GY IP 250 OP 250 PS 637: Performed by: ANESTHESIOLOGY

## 2023-10-03 PROCEDURE — 44970 LAPAROSCOPY APPENDECTOMY: CPT | Performed by: SURGERY

## 2023-10-03 PROCEDURE — G0378 HOSPITAL OBSERVATION PER HR: HCPCS

## 2023-10-03 PROCEDURE — 250N000011 HC RX IP 250 OP 636: Performed by: NURSE ANESTHETIST, CERTIFIED REGISTERED

## 2023-10-03 PROCEDURE — 370N000017 HC ANESTHESIA TECHNICAL FEE, PER MIN: Performed by: SURGERY

## 2023-10-03 PROCEDURE — 258N000003 HC RX IP 258 OP 636: Performed by: NURSE ANESTHETIST, CERTIFIED REGISTERED

## 2023-10-03 PROCEDURE — 999N000141 HC STATISTIC PRE-PROCEDURE NURSING ASSESSMENT: Performed by: SURGERY

## 2023-10-03 PROCEDURE — 710N000009 HC RECOVERY PHASE 1, LEVEL 1, PER MIN: Performed by: SURGERY

## 2023-10-03 PROCEDURE — 88304 TISSUE EXAM BY PATHOLOGIST: CPT | Mod: TC | Performed by: SURGERY

## 2023-10-03 PROCEDURE — 250N000009 HC RX 250: Performed by: SURGERY

## 2023-10-03 PROCEDURE — 250N000009 HC RX 250: Performed by: NURSE ANESTHETIST, CERTIFIED REGISTERED

## 2023-10-03 PROCEDURE — 99223 1ST HOSP IP/OBS HIGH 75: CPT | Performed by: STUDENT IN AN ORGANIZED HEALTH CARE EDUCATION/TRAINING PROGRAM

## 2023-10-03 PROCEDURE — 250N000013 HC RX MED GY IP 250 OP 250 PS 637: Performed by: SURGERY

## 2023-10-03 RX ORDER — ONDANSETRON 2 MG/ML
4 INJECTION INTRAMUSCULAR; INTRAVENOUS EVERY 30 MIN PRN
Status: DISCONTINUED | OUTPATIENT
Start: 2023-10-03 | End: 2023-10-03 | Stop reason: HOSPADM

## 2023-10-03 RX ORDER — HYDROMORPHONE HYDROCHLORIDE 1 MG/ML
0.4 INJECTION, SOLUTION INTRAMUSCULAR; INTRAVENOUS; SUBCUTANEOUS EVERY 5 MIN PRN
Status: DISCONTINUED | OUTPATIENT
Start: 2023-10-03 | End: 2023-10-03 | Stop reason: HOSPADM

## 2023-10-03 RX ORDER — LIDOCAINE 40 MG/G
CREAM TOPICAL
Status: DISCONTINUED | OUTPATIENT
Start: 2023-10-03 | End: 2023-10-03 | Stop reason: HOSPADM

## 2023-10-03 RX ORDER — NALOXONE HYDROCHLORIDE 0.4 MG/ML
0.4 INJECTION, SOLUTION INTRAMUSCULAR; INTRAVENOUS; SUBCUTANEOUS
Status: DISCONTINUED | OUTPATIENT
Start: 2023-10-03 | End: 2023-10-03 | Stop reason: HOSPADM

## 2023-10-03 RX ORDER — HYDROMORPHONE HCL IN WATER/PF 6 MG/30 ML
0.2 PATIENT CONTROLLED ANALGESIA SYRINGE INTRAVENOUS
Status: DISCONTINUED | OUTPATIENT
Start: 2023-10-03 | End: 2023-10-03 | Stop reason: HOSPADM

## 2023-10-03 RX ORDER — ONDANSETRON 4 MG/1
4 TABLET, ORALLY DISINTEGRATING ORAL EVERY 30 MIN PRN
Status: DISCONTINUED | OUTPATIENT
Start: 2023-10-03 | End: 2023-10-03 | Stop reason: HOSPADM

## 2023-10-03 RX ORDER — NALOXONE HYDROCHLORIDE 0.4 MG/ML
0.2 INJECTION, SOLUTION INTRAMUSCULAR; INTRAVENOUS; SUBCUTANEOUS
Status: DISCONTINUED | OUTPATIENT
Start: 2023-10-03 | End: 2023-10-03 | Stop reason: HOSPADM

## 2023-10-03 RX ORDER — OXYCODONE HYDROCHLORIDE 5 MG/1
5 TABLET ORAL EVERY 4 HOURS PRN
Qty: 6 TABLET | Refills: 0 | Status: SHIPPED | OUTPATIENT
Start: 2023-10-03

## 2023-10-03 RX ORDER — ACETAMINOPHEN 650 MG/1
650 SUPPOSITORY RECTAL EVERY 6 HOURS PRN
Status: DISCONTINUED | OUTPATIENT
Start: 2023-10-03 | End: 2023-10-03 | Stop reason: HOSPADM

## 2023-10-03 RX ORDER — ACETAMINOPHEN 325 MG/1
650 TABLET ORAL EVERY 6 HOURS PRN
Status: DISCONTINUED | OUTPATIENT
Start: 2023-10-03 | End: 2023-10-03 | Stop reason: HOSPADM

## 2023-10-03 RX ORDER — FENTANYL CITRATE 50 UG/ML
25 INJECTION, SOLUTION INTRAMUSCULAR; INTRAVENOUS EVERY 5 MIN PRN
Status: DISCONTINUED | OUTPATIENT
Start: 2023-10-03 | End: 2023-10-03 | Stop reason: HOSPADM

## 2023-10-03 RX ORDER — PROPOFOL 10 MG/ML
INJECTION, EMULSION INTRAVENOUS CONTINUOUS PRN
Status: DISCONTINUED | OUTPATIENT
Start: 2023-10-03 | End: 2023-10-03

## 2023-10-03 RX ORDER — ESTRADIOL 0.1 MG/G
1 CREAM VAGINAL PRN
COMMUNITY
Start: 2023-01-23

## 2023-10-03 RX ORDER — OXYCODONE HYDROCHLORIDE 5 MG/1
5 TABLET ORAL EVERY 4 HOURS PRN
Status: DISCONTINUED | OUTPATIENT
Start: 2023-10-03 | End: 2023-10-03 | Stop reason: HOSPADM

## 2023-10-03 RX ORDER — DEXAMETHASONE SODIUM PHOSPHATE 10 MG/ML
INJECTION, SOLUTION INTRAMUSCULAR; INTRAVENOUS PRN
Status: DISCONTINUED | OUTPATIENT
Start: 2023-10-03 | End: 2023-10-03

## 2023-10-03 RX ORDER — TOPIRAMATE 100 MG/1
100 TABLET, FILM COATED ORAL AT BEDTIME
COMMUNITY
End: 2023-10-12

## 2023-10-03 RX ORDER — HYDROMORPHONE HCL IN WATER/PF 6 MG/30 ML
0.4 PATIENT CONTROLLED ANALGESIA SYRINGE INTRAVENOUS
Status: DISCONTINUED | OUTPATIENT
Start: 2023-10-03 | End: 2023-10-03 | Stop reason: HOSPADM

## 2023-10-03 RX ORDER — SODIUM CHLORIDE, SODIUM LACTATE, POTASSIUM CHLORIDE, CALCIUM CHLORIDE 600; 310; 30; 20 MG/100ML; MG/100ML; MG/100ML; MG/100ML
INJECTION, SOLUTION INTRAVENOUS CONTINUOUS
Status: DISCONTINUED | OUTPATIENT
Start: 2023-10-03 | End: 2023-10-03 | Stop reason: HOSPADM

## 2023-10-03 RX ORDER — PROPOFOL 10 MG/ML
INJECTION, EMULSION INTRAVENOUS PRN
Status: DISCONTINUED | OUTPATIENT
Start: 2023-10-03 | End: 2023-10-03

## 2023-10-03 RX ORDER — FENTANYL CITRATE 50 UG/ML
50 INJECTION, SOLUTION INTRAMUSCULAR; INTRAVENOUS EVERY 5 MIN PRN
Status: DISCONTINUED | OUTPATIENT
Start: 2023-10-03 | End: 2023-10-03 | Stop reason: HOSPADM

## 2023-10-03 RX ORDER — ACETAMINOPHEN 325 MG/1
975 TABLET ORAL ONCE
Status: COMPLETED | OUTPATIENT
Start: 2023-10-03 | End: 2023-10-03

## 2023-10-03 RX ORDER — OXYCODONE HYDROCHLORIDE 5 MG/1
10 TABLET ORAL
Status: DISCONTINUED | OUTPATIENT
Start: 2023-10-03 | End: 2023-10-03 | Stop reason: HOSPADM

## 2023-10-03 RX ORDER — FENTANYL CITRATE 50 UG/ML
INJECTION, SOLUTION INTRAMUSCULAR; INTRAVENOUS PRN
Status: DISCONTINUED | OUTPATIENT
Start: 2023-10-03 | End: 2023-10-03

## 2023-10-03 RX ORDER — ONDANSETRON 4 MG/1
4 TABLET, ORALLY DISINTEGRATING ORAL EVERY 6 HOURS PRN
Status: DISCONTINUED | OUTPATIENT
Start: 2023-10-03 | End: 2023-10-03 | Stop reason: HOSPADM

## 2023-10-03 RX ORDER — PROCHLORPERAZINE 25 MG
25 SUPPOSITORY, RECTAL RECTAL EVERY 12 HOURS PRN
Status: DISCONTINUED | OUTPATIENT
Start: 2023-10-03 | End: 2023-10-03 | Stop reason: HOSPADM

## 2023-10-03 RX ORDER — LIDOCAINE HYDROCHLORIDE 10 MG/ML
INJECTION, SOLUTION INFILTRATION; PERINEURAL PRN
Status: DISCONTINUED | OUTPATIENT
Start: 2023-10-03 | End: 2023-10-03

## 2023-10-03 RX ORDER — HYDROMORPHONE HYDROCHLORIDE 1 MG/ML
0.2 INJECTION, SOLUTION INTRAMUSCULAR; INTRAVENOUS; SUBCUTANEOUS EVERY 5 MIN PRN
Status: DISCONTINUED | OUTPATIENT
Start: 2023-10-03 | End: 2023-10-03 | Stop reason: HOSPADM

## 2023-10-03 RX ORDER — CIPROFLOXACIN 500 MG/1
500 TABLET, FILM COATED ORAL EVERY 12 HOURS SCHEDULED
Status: DISCONTINUED | OUTPATIENT
Start: 2023-10-03 | End: 2023-10-03 | Stop reason: HOSPADM

## 2023-10-03 RX ORDER — MAGNESIUM HYDROXIDE 1200 MG/15ML
LIQUID ORAL PRN
Status: DISCONTINUED | OUTPATIENT
Start: 2023-10-03 | End: 2023-10-03 | Stop reason: HOSPADM

## 2023-10-03 RX ORDER — OXYCODONE HYDROCHLORIDE 5 MG/1
5 TABLET ORAL
Status: COMPLETED | OUTPATIENT
Start: 2023-10-03 | End: 2023-10-03

## 2023-10-03 RX ORDER — ONDANSETRON 2 MG/ML
4 INJECTION INTRAMUSCULAR; INTRAVENOUS EVERY 6 HOURS PRN
Status: DISCONTINUED | OUTPATIENT
Start: 2023-10-03 | End: 2023-10-03 | Stop reason: HOSPADM

## 2023-10-03 RX ORDER — PROCHLORPERAZINE MALEATE 10 MG
10 TABLET ORAL EVERY 6 HOURS PRN
Status: DISCONTINUED | OUTPATIENT
Start: 2023-10-03 | End: 2023-10-03 | Stop reason: HOSPADM

## 2023-10-03 RX ADMIN — SUGAMMADEX 200 MG: 100 INJECTION, SOLUTION INTRAVENOUS at 09:12

## 2023-10-03 RX ADMIN — ACETAMINOPHEN 975 MG: 325 TABLET ORAL at 07:03

## 2023-10-03 RX ADMIN — ROCURONIUM BROMIDE 50 MG: 50 INJECTION, SOLUTION INTRAVENOUS at 08:35

## 2023-10-03 RX ADMIN — ONDANSETRON 4 MG: 2 INJECTION INTRAMUSCULAR; INTRAVENOUS at 01:36

## 2023-10-03 RX ADMIN — MIDAZOLAM 2 MG: 1 INJECTION INTRAMUSCULAR; INTRAVENOUS at 08:26

## 2023-10-03 RX ADMIN — PHENYLEPHRINE HYDROCHLORIDE 100 MCG: 10 INJECTION INTRAVENOUS at 08:42

## 2023-10-03 RX ADMIN — OXYCODONE HYDROCHLORIDE 5 MG: 5 TABLET ORAL at 10:11

## 2023-10-03 RX ADMIN — FENTANYL CITRATE 50 MCG: 50 INJECTION, SOLUTION INTRAMUSCULAR; INTRAVENOUS at 08:51

## 2023-10-03 RX ADMIN — PROPOFOL 180 MG: 10 INJECTION, EMULSION INTRAVENOUS at 08:35

## 2023-10-03 RX ADMIN — CIPROFLOXACIN 500 MG: 500 TABLET, FILM COATED ORAL at 06:14

## 2023-10-03 RX ADMIN — DEXAMETHASONE SODIUM PHOSPHATE 10 MG: 10 INJECTION, SOLUTION INTRAMUSCULAR; INTRAVENOUS at 08:35

## 2023-10-03 RX ADMIN — LIDOCAINE HYDROCHLORIDE 5 ML: 10 INJECTION, SOLUTION INFILTRATION; PERINEURAL at 08:35

## 2023-10-03 RX ADMIN — SODIUM CHLORIDE, POTASSIUM CHLORIDE, SODIUM LACTATE AND CALCIUM CHLORIDE: 600; 310; 30; 20 INJECTION, SOLUTION INTRAVENOUS at 06:49

## 2023-10-03 RX ADMIN — PROPOFOL 150 MCG/KG/MIN: 10 INJECTION, EMULSION INTRAVENOUS at 08:35

## 2023-10-03 RX ADMIN — FENTANYL CITRATE 50 MCG: 50 INJECTION, SOLUTION INTRAMUSCULAR; INTRAVENOUS at 08:35

## 2023-10-03 RX ADMIN — HYDROMORPHONE HYDROCHLORIDE 0.4 MG: 0.2 INJECTION, SOLUTION INTRAMUSCULAR; INTRAVENOUS; SUBCUTANEOUS at 01:36

## 2023-10-03 ASSESSMENT — ACTIVITIES OF DAILY LIVING (ADL)
ADLS_ACUITY_SCORE: 35

## 2023-10-03 NOTE — ED NOTES
[facility] ED Handoff Report    ED Chief Complaint: Abdominal pain    ED Diagnosis:  (K35.80) Acute appendicitis, unspecified acute appendicitis type  Comment:   Plan: Case Request: APPENDECTOMY, LAPAROSCOPIC            (N28.9) Renal lesion  Comment:   Plan: Adult Urology  Referral               PMH:  History reviewed. No pertinent past medical history.     Code Status:  No Order     Falls Risk: No Band: Not applicable    Current Living Situation/Residence: lives with a significant other and lives in a house     Elimination Status: Continent: Yes     Activity Level: Independent    Patients Preferred Language:  English     Needed: No    Vital Signs:  BP (!) 142/84   Pulse 73   Temp 98  F (36.7  C) (Temporal)   Resp 17   Wt 73 kg (161 lb)   SpO2 96%   BMI 26.79 kg/m       Cardiac Rhythm:     Pain Score: 5/10, see MAR for last dose of dilaudid    Is the Patient Confused:  No    Last Food or Drink: 10/02/23 last food at 11 am, last drink at 2130    Focused Assessment:  Patient reports RLQ pain with nausea. Pain improved with PRN medication.    Tests Performed: Done: Labs and Imaging    Treatments Provided:  See MAR    Family Dynamics/Concerns: No    Family Updated On Visitor Policy: Yes    Plan of Care Communicated to Family: Yes    Who Was Updated about Plan of Care: Vinh, spouse    Belongings Checklist Done and Signed by Patient: Yes    Medications sent with patient: n/a    Additional Information:     RN: Anjelica English RN 10/2/2023 10:50 PM

## 2023-10-03 NOTE — PROGRESS NOTES
[facility]    Medicine Progress Note - Hospitalist Service    Date of Admission:  10/2/2023    Assessment & Plan   Lolly Manzano is a 63F presents with acute abd pain, found to have appendicitis; pmhx includes diverticulitis, stess incontinence, MDD; admitted for acute appendicitis, pending appendectomy with ACS, anticipated 10/3.     1.Acute appendicitis  -s/p appy today  -did well   -I did see her in PACU-awake and has good BS already  -surg will send home today from surgery area  -activity and pain meds per surg    2.Kidney lesion  -8 mm right kidney  -I told her she needs follow up and will need ct or mri to check--she remembered from admit about this  -Urology consult was already placed     3.migraine  -at home takes topamax     4. HTN  -post op ok restart lisinopril     5.metabolic acidosis  -improved                   Diet:  per surg post op  DVT Prophylaxis: Pneumatic Compression Devices  Adorno Catheter: Not present  Lines: None     Cardiac Monitoring: ACTIVE order. Indication: Procedural area  Code Status: Full Code      Clinically Significant Risk Factors Present on Admission           # Hypercalcemia: Highest Ca = 10.4 mg/dL in last 2 days, will monitor as appropriate        # Hypertension: Home medication list includes antihypertensive(s)                 Disposition Plan      Expected Discharge Date: 10/03/2023      Destination: home with family            Clara Johnson MD  Hospitalist Service  [facility]  Securely message with [employer] (more info)  Text page via [employer] Paging/Directory   ______________________________________________________________________    Interval History   She is awake after surgery this am  Doing well in PACU  Minimal pain  Wants to go home     Physical Exam   Vital Signs: Temp: 97.7  F (36.5  C) Temp src: Temporal BP: (!) 144/88 Pulse: 54   Resp: 16 SpO2: 98 % O2 Device: None (Room air) Oxygen Delivery: 6 LPM  Weight: 161  lbs 0 oz    Constitutional: awake, alert, cooperative, and no apparent distress  Respiratory: No increased work of breathing, good air exchange, clear to auscultation bilaterally, no crackles or wheezing  Cardiovascular: Normal apical impulse, regular rate and rhythm, normal S1 and S2, no S3 or S4, and no murmur noted  GI: normal bowel sounds, soft, non-distended, and minimal tenderness in RLQ  Skin: no bruising or bleeding  Musculoskeletal: no lower extremity pitting edema present  Neurologic: Mental Status Exam:  Level of Alertness:   awake  Neuropsychiatric: General: normal, calm, and normal eye contact        Data     I have personally reviewed the following data over the past 24 hrs:    8.2  \   13.0   / 265     138 105 21.0 /  94   4.1 22 1.06 (H) \     ALT: 19 AST: 19 AP: 56 TBILI: 0.4   ALB: 4.6 TOT PROTEIN: 7.6 LIPASE: N/A       Imaging results reviewed over the past 24 hrs:   Recent Results (from the past 24 hour(s))   CT Abdomen Pelvis w Contrast    Narrative    EXAM: CT ABDOMEN PELVIS W CONTRAST  LOCATION: [facility]  DATE: 10/2/2023    INDICATION: RLQ pain  COMPARISON: 07/08/2015   TECHNIQUE: CT scan of the abdomen and pelvis was performed following injection of IV contrast. Multiplanar reformats were obtained. Dose reduction techniques were used.  CONTRAST: ISOVUE 370 75ML    FINDINGS:   LOWER CHEST: Small hiatal hernia.     HEPATOBILIARY: Normal.    PANCREAS: Normal.    SPLEEN: Normal.    ADRENAL GLANDS: Normal.    KIDNEYS/BLADDER: A simple right renal cyst does not require follow-up. An 8 mm right renal lesion is 35 Hounsfield units.  No hydronephrosis or hydroureter. No ureteral calculi. Normal urinary bladder.    BOWEL: Colonic diverticulosis. The appendix is dilated at 9 mm. No abscess or free air.     LYMPH NODES: Normal.    VASCULATURE: Atherosclerotic disease.     PELVIC ORGANS: Hysterectomy.     MUSCULOSKELETAL: Normal.      Impression    IMPRESSION:   1.  Acute  appendicitis. No perforation or abscess.  2.  An 8 mm right renal lesion does not meet criteria for a simple cyst. Renal mass protocol CT or MRI is recommended for further evaluation.

## 2023-10-03 NOTE — ANESTHESIA PROCEDURE NOTES
Airway       Patient location during procedure: OR       Procedure Start/Stop Times: 10/3/2023 8:36 AM  Staff -        CRNA: Norma Hanson APRN CRNA       Performed By: CRNA  Consent for Airway        Urgency: elective  Indications and Patient Condition       Indications for airway management: tomas-procedural       Induction type:intravenous       Mask difficulty assessment: 1 - vent by mask    Final Airway Details       Final airway type: endotracheal airway       Successful airway: ETT - single and Oral  Endotracheal Airway Details        ETT size (mm): 6.5       Cuffed: yes       Successful intubation technique: direct laryngoscopy       DL Blade Type: Andrade 2       Grade View of Cords: 1       Adjucts: stylet       Position: Right       Measured from: gums/teeth       Secured at (cm): 22       Bite block used: None    Post intubation assessment        Placement verified by: capnometry, equal breath sounds and chest rise        Number of attempts at approach: 1       Secured with: silk tape       Ease of procedure: easy       Dentition: Intact and Unchanged       Dental guard used and removed.    Medication(s) Administered   Medication Administration Time: 10/3/2023 8:36 AM

## 2023-10-03 NOTE — ED NOTES
"EMERGENCY DEPARTMENT SIGN OUT NOTE        ED COURSE AND MEDICAL DECISION MAKING  Patient was signed out to me by Dr Barbara Abraham at 10:33 PM   12:12 AM I discussed the patient with Dr. Becerra from the hospitalist service who agrees to admit the patient.      In brief, Lolly Manzano is a 63 year old female who initially presented with abdominal pain       \"Patient reports an acute onset of RLQ abdominal pain, beginning earlier today around 0930. Pain is progressive in nature, but provoked with walking and improved with rest. Also complains of nausea without emesis. Applied heat pad with no relief, and has not taken any medications prior to arrival.\"    At time of sign out, disposition was pending transfer to the OR for appendectomy with general surgery.   11:33 PM patient discussed with Dr. Banks from surgery who has been in the OR with another case.  He is aware of the patient and currently going to the OR with another appendectomy.  Surgery requested patient be admitted to medicine for add-on first thing in the morning.  Patient discussed with hospitalist and will be plan for admission and appendectomy.    FINAL IMPRESSION    1. Acute appendicitis, unspecified acute appendicitis type    2. Renal lesion      ED MEDS  Medications   HYDROmorphone (PF) (DILAUDID) injection 0.3 mg (0.3 mg Intravenous $Given 10/2/23 2053)   lisinopril (ZESTRIL) tablet 10 mg (10 mg Oral $Given 10/2/23 2133)   sodium chloride 0.9% infusion ( Intravenous $New Bag 10/2/23 2207)   iopamidol (ISOVUE-370) solution 75 mL (75 mLs Intravenous $Given 10/2/23 1843)   cefTRIAXone (ROCEPHIN) 1 g vial to attach to  mL bag for ADULTS or NS 50 mL bag for PEDS (0 g Intravenous Stopped 10/2/23 2055)   metroNIDAZOLE (FLAGYL) infusion 500 mg (0 mg Intravenous Stopped 10/2/23 2156)   topiramate (TOPAMAX) tablet 100 mg (100 mg Oral $Given 10/2/23 2133)       LAB  Labs Ordered and Resulted from Time of ED Arrival to Time of ED Departure "   COMPREHENSIVE METABOLIC PANEL - Abnormal       Result Value    Sodium 137      Potassium 4.0      Carbon Dioxide (CO2) 21 (*)     Anion Gap 14      Urea Nitrogen 23.2 (*)     Creatinine 0.98 (*)     GFR Estimate 65      Calcium 10.4 (*)     Chloride 102      Glucose 96      Alkaline Phosphatase 56      AST 19      ALT 19      Protein Total 7.6      Albumin 4.6      Bilirubin Total 0.4     ROUTINE UA WITH MICROSCOPIC REFLEX TO CULTURE - Abnormal    Color Urine Light Yellow      Appearance Urine Clear      Glucose Urine Negative      Bilirubin Urine Negative      Ketones Urine Negative      Specific Gravity Urine 1.011      Blood Urine Negative      pH Urine 5.0      Protein Albumin Urine Negative      Urobilinogen Urine <2.0      Nitrite Urine Negative      Leukocyte Esterase Urine Negative      Mucus Urine Present (*)     RBC Urine <1      WBC Urine 1      Hyaline Casts Urine 1     CBC WITH PLATELETS AND DIFFERENTIAL - Abnormal    WBC Count 14.4 (*)     RBC Count 4.55      Hemoglobin 13.7      Hematocrit 42.3      MCV 93      MCH 30.1      MCHC 32.4      RDW 12.9      Platelet Count 311      % Neutrophils 69      % Lymphocytes 23      % Monocytes 6      Mids % (Monos, Eos, Basos)        % Eosinophils 1      % Basophils 1      % Immature Granulocytes 0      NRBCs per 100 WBC 0      Absolute Neutrophils 9.8 (*)     Absolute Lymphocytes 3.3      Absolute Monocytes 0.9      Mids Abs (Monos, Eos, Basos)        Absolute Eosinophils 0.2      Absolute Basophils 0.1      Absolute Immature Granulocytes 0.1      Absolute NRBCs 0.0       RADIOLOGY    CT Abdomen Pelvis w Contrast   Final Result   IMPRESSION:    1.  Acute appendicitis. No perforation or abscess.   2.  An 8 mm right renal lesion does not meet criteria for a simple cyst. Renal mass protocol CT or MRI is recommended for further evaluation.           DISCHARGE MEDS  New Prescriptions    No medications on file       Rafita Acosta MD  [facility]  [facility] EMERGENCY DEPARTMENT  [address] [place] [id number]  [phone]       Rafita Acosta MD  10/03/23 0013

## 2023-10-03 NOTE — CONSULTS
General surgery consult         ASSESSMENT     1. Acute appendicitis, unspecified acute appendicitis type    2. Renal lesion       Order acute appendicitis      PLAN      Laparoscopic appendectomy         CHIEF COMPLAINT     Chief Complaint   Patient presents with    Abdominal Pain         HPI     Lolly Manzano is a 63 year old female who presents abdominal pain at 930 this morning and that pain localized in the right lower quadrant.  She was evaluated with a CT scan shows evidence of an acute appendicitis.         PAST MEDICAL HISTORY SURGICAL HISTORY     History reviewed. No pertinent past medical history. History reviewed. No pertinent surgical history.       CURRENT MEDICATIONS ALLERGIES     Current Outpatient Rx   Medication Sig Dispense Refill    CALCIUM + D OR unsure of dose daily      fluticasone (FLONASE) 50 MCG/ACT nasal spray       glucosamine 500 MG CAPS capsule Take 2,000 mg by mouth      lisinopril (ZESTRIL) 10 MG tablet Take 1 tablet by mouth daily      MAGNESIUM CITRATE OR unsure of dose daily      ORDER FOR DME R wrist splint 1 0    topiramate (TOPAMAX) 100 MG tablet TAKE 1 TABLET AT BEDTIME DUE FOR APPT WITH DR ALBERT 30 tablet 0    topiramate (TOPAMAX) 100 MG tablet Take 1 tablet (100 mg) by mouth At Bedtime 90 tablet 1    topiramate (TOPAMAX) 100 MG tablet TAKE 1 TABLET AT BEDTIME 90 tablet 1    ZOLMitriptan (ZOMIG) 2.5 MG tablet Take 1 tab(s) PO Once per day prn migraine 20 tablet 3    Allergies   Allergen Reactions    Nuts Hives    Estrogens      Weight loss      Influenza Vaccine Live Other (See Comments)     Patient states she became very ill with flu vaccine    Pcn [Penicillins] Hives    Sulfa Antibiotics Rash          FAMILY HISTORY     Family History   Problem Relation Age of Onset    Hypertension Mother     Myocardial Infarction Mother     Alzheimer Disease Father     Cerebrovascular Disease Father     Depression Sister     Leukemia Brother     Seizure Disorder Son          SOCIAL  HISTORY     Social History     Socioeconomic History    Marital status:      Spouse name: None    Number of children: None    Years of education: None    Highest education level: None   Tobacco Use    Smoking status: Former    Smokeless tobacco: Never   Substance and Sexual Activity    Alcohol use: Not Currently         REVIEW OF SYSTEMS       12 point review of systems are unremarkable except for the symptoms described in the HPI    PHYSICAL EXAM     VITAL SIGNS: BP (!) 142/84   Pulse 73   Temp 98  F (36.7  C) (Temporal)   Resp 17   Wt 73 kg (161 lb)   SpO2 96%   BMI 26.79 kg/m     General : Alert, cooperative, appears stated age   Skin: Skin color, texture, turgor normal, no rashes or lesions   Head: Normocephalic, without obvious abnormality, atraumatic   HEENT:  conjunctiva/corneas clear, EOM's intact, no scleral icterus   Throat: Lips, mucosa, and tongue normal; teeth and gums normal    Neck: Supple, thyroid not enlarged   Back: No CVA tenderness   Lungs: Clear to auscultation bilaterally, respirations unlabored, no rales, rhonchi or wheezes   Chest Wall:No tenderness or deformity   Heart: Regular rate and rhythm, S1, S2 normal, no murmur, rub or gallop   Abdomen: Soft, tender to palpation in the right lower quadrant, no guarding, + bowel sounds active,   Extremities : No obvious swelling,  Neurologic: Cranial Nerves II-XII normal, moves all extremities equally, no focal findings          RADIOLOGY      CT Abdomen Pelvis w Contrast   Final Result   IMPRESSION:    1.  Acute appendicitis. No perforation or abscess.   2.  An 8 mm right renal lesion does not meet criteria for a simple cyst. Renal mass protocol CT or MRI is recommended for further evaluation.             EKG     Reviewed        LABS     Results for orders placed or performed during the hospital encounter of 10/02/23   CT Abdomen Pelvis w Contrast    Impression    IMPRESSION:   1.  Acute appendicitis. No perforation or abscess.  2.  An 8  mm right renal lesion does not meet criteria for a simple cyst. Renal mass protocol CT or MRI is recommended for further evaluation.     Comprehensive metabolic panel   Result Value Ref Range    Sodium 137 135 - 145 mmol/L    Potassium 4.0 3.4 - 5.3 mmol/L    Carbon Dioxide (CO2) 21 (L) 22 - 29 mmol/L    Anion Gap 14 7 - 15 mmol/L    Urea Nitrogen 23.2 (H) 8.0 - 23.0 mg/dL    Creatinine 0.98 (H) 0.51 - 0.95 mg/dL    GFR Estimate 65 >60 mL/min/1.73m2    Calcium 10.4 (H) 8.8 - 10.2 mg/dL    Chloride 102 98 - 107 mmol/L    Glucose 96 70 - 99 mg/dL    Alkaline Phosphatase 56 35 - 104 U/L    AST 19 0 - 45 U/L    ALT 19 0 - 50 U/L    Protein Total 7.6 6.4 - 8.3 g/dL    Albumin 4.6 3.5 - 5.2 g/dL    Bilirubin Total 0.4 <=1.2 mg/dL   UA with Microscopic reflex to Culture    Specimen: Urine, Clean Catch   Result Value Ref Range    Color Urine Light Yellow Colorless, Straw, Light Yellow, Yellow    Appearance Urine Clear Clear    Glucose Urine Negative Negative mg/dL    Bilirubin Urine Negative Negative    Ketones Urine Negative Negative mg/dL    Specific Gravity Urine 1.011 1.001 - 1.030    Blood Urine Negative Negative    pH Urine 5.0 5.0 - 7.0    Protein Albumin Urine Negative Negative mg/dL    Urobilinogen Urine <2.0 <2.0 mg/dL    Nitrite Urine Negative Negative    Leukocyte Esterase Urine Negative Negative    Mucus Urine Present (A) None Seen /LPF    RBC Urine <1 <=2 /HPF    WBC Urine 1 <=5 /HPF    Hyaline Casts Urine 1 <=2 /LPF   CBC with platelets and differential   Result Value Ref Range    WBC Count 14.4 (H) 4.0 - 11.0 10e3/uL    RBC Count 4.55 3.80 - 5.20 10e6/uL    Hemoglobin 13.7 11.7 - 15.7 g/dL    Hematocrit 42.3 35.0 - 47.0 %    MCV 93 78 - 100 fL    MCH 30.1 26.5 - 33.0 pg    MCHC 32.4 31.5 - 36.5 g/dL    RDW 12.9 10.0 - 15.0 %    Platelet Count 311 150 - 450 10e3/uL    % Neutrophils 69 %    % Lymphocytes 23 %    % Monocytes 6 %    Mids % (Monos, Eos, Basos)      % Eosinophils 1 %    % Basophils 1 %    %  Immature Granulocytes 0 %    NRBCs per 100 WBC 0 <1 /100    Absolute Neutrophils 9.8 (H) 1.6 - 8.3 10e3/uL    Absolute Lymphocytes 3.3 0.8 - 5.3 10e3/uL    Absolute Monocytes 0.9 0.0 - 1.3 10e3/uL    Mids Abs (Monos, Eos, Basos)      Absolute Eosinophils 0.2 0.0 - 0.7 10e3/uL    Absolute Basophils 0.1 0.0 - 0.2 10e3/uL    Absolute Immature Granulocytes 0.1 <=0.4 10e3/uL    Absolute NRBCs 0.0 10e3/uL           [facility]  [phone]

## 2023-10-03 NOTE — ANESTHESIA PREPROCEDURE EVALUATION
Anesthesia Pre-Procedure Evaluation    Patient: Lolly Manzano   MRN: [id number] : 1960        Procedure : Procedure(s):  APPENDECTOMY, LAPAROSCOPIC          History reviewed. No pertinent past medical history.   History reviewed. No pertinent surgical history.   Allergies   Allergen Reactions    Nuts Hives    Estrogens      Weight loss      Influenza Vaccine Live Other (See Comments)     Patient states she became very ill with flu vaccine    Pcn [Penicillins] Hives    Sulfa Antibiotics Rash      Social History     Tobacco Use    Smoking status: Former    Smokeless tobacco: Never   Substance Use Topics    Alcohol use: Not Currently      Wt Readings from Last 1 Encounters:   10/02/23 73 kg (161 lb)        Anesthesia Evaluation   Pt has had prior anesthetic.         ROS/MED HX  ENT/Pulmonary:  - neg pulmonary ROS  Tobacco use: ekg.   Neurologic:     (+)      migraines,                          Cardiovascular:    (-) murmur and wheezes   METS/Exercise Tolerance: >4 METS    Hematologic:  - neg hematologic  ROS     Musculoskeletal:  - neg musculoskeletal ROS     GI/Hepatic:     (+)         appendicitis,           Renal/Genitourinary:     (+) renal disease, type: CRI,            Endo:  - neg endo ROS     Psychiatric/Substance Use:  - neg psychiatric ROS     Infectious Disease:  - neg infectious disease ROS     Malignancy:  - neg malignancy ROS     Other:  - neg other ROS          Physical Exam    Airway        Mallampati: II   TM distance: > 3 FB   Neck ROM: full   Mouth opening: > 3 cm    Respiratory Devices and Support         Dental  no notable dental history     (+) Minor Abnormalities - some fillings, tiny chips      Cardiovascular          Rhythm and rate: regular and normal (-) no murmur    Pulmonary           breath sounds clear to auscultation   (-) no wheezes        OUTSIDE LABS:  CBC:   Lab Results   Component Value Date    WBC 14.4 (H) 10/02/2023    WBC 6.8 2022    HGB 13.7 10/02/2023    HGB  14.8 08/23/2022    HCT 42.3 10/02/2023    HCT 44.6 08/23/2022     10/02/2023     08/23/2022     BMP:   Lab Results   Component Value Date     10/02/2023     08/23/2022    POTASSIUM 4.0 10/02/2023    POTASSIUM 4.0 08/23/2022    CHLORIDE 102 10/02/2023    CHLORIDE 105 08/23/2022    CO2 21 (L) 10/02/2023    CO2 27 08/23/2022    BUN 23.2 (H) 10/02/2023    BUN 24 (H) 08/23/2022    CR 0.98 (H) 10/02/2023    CR 1.22 (H) 08/23/2022    GLC 96 10/02/2023    GLC 83 08/23/2022     COAGS: No results found for: PTT, INR, FIBR  POC: No results found for: BGM, HCG, HCGS  HEPATIC:   Lab Results   Component Value Date    ALBUMIN 4.6 10/02/2023    PROTTOTAL 7.6 10/02/2023    ALT 19 10/02/2023    AST 19 10/02/2023    ALKPHOS 56 10/02/2023    BILITOTAL 0.4 10/02/2023     OTHER:   Lab Results   Component Value Date    DEVORA 10.4 (H) 10/02/2023       Anesthesia Plan    ASA Status:  2    NPO Status:  NPO Appropriate    Anesthesia Type: General.     - Airway: ETT   Induction: Intravenous, Propofol.   Maintenance: Balanced.        Consents    Anesthesia Plan(s) and associated risks, benefits, and realistic alternatives discussed. Questions answered and patient/representative(s) expressed understanding.     - Discussed: Risks, Benefits and Alternatives for BOTH SEDATION and the PROCEDURE were discussed     - Discussed with:  Patient       - Patient is DNR/DNI Status: No          Postoperative Care    Pain management: IV analgesics, Oral pain medications.   PONV prophylaxis: Ondansetron (or other 5HT-3), Dexamethasone or Solumedrol     Comments:    Other Comments: GETA  Decadron/Zofran for PONV  Background propofol infusion            Duncan Max MD

## 2023-10-03 NOTE — ANESTHESIA POSTPROCEDURE EVALUATION
Patient: Lolly Manzano    Procedure: Procedure(s):  APPENDECTOMY, LAPAROSCOPIC       Anesthesia Type:  General    Note:  Disposition: Outpatient   Postop Pain Control: Uneventful            Sign Out: Well controlled pain   PONV: No   Neuro/Psych: Uneventful            Sign Out: Acceptable/Baseline neuro status   Airway/Respiratory: Uneventful            Sign Out: Acceptable/Baseline resp. status   CV/Hemodynamics: Uneventful            Sign Out: Acceptable CV status; No obvious hypovolemia; No obvious fluid overload   Other NRE: NONE   DID A NON-ROUTINE EVENT OCCUR? No           Last vitals:  Vitals Value Taken Time   /83 10/03/23 1017   Temp 36.5  C (97.7  F) 10/03/23 0923   Pulse 62 10/03/23 1017   Resp 18 10/03/23 1017   SpO2 99 % 10/03/23 1017   Vitals shown include unvalidated device data.    Electronically Signed By: Duncan Max MD  October 3, 2023  11:41 AM

## 2023-10-03 NOTE — ANESTHESIA CARE TRANSFER NOTE
Patient: Lolly Manzano    Procedure: Procedure(s):  APPENDECTOMY, LAPAROSCOPIC       Diagnosis: Acute appendicitis, unspecified acute appendicitis type [K35.80]  Diagnosis Additional Information: No value filed.    Anesthesia Type:   General     Note:      Level of Consciousness: awake  Oxygen Supplementation: face mask  Level of Supplemental Oxygen (L/min / FiO2): 8  Independent Airway: airway patency satisfactory and stable        Patient transferred to: PACU    Handoff Report: Identifed the Patient, Identified the Reponsible Provider, Reviewed the pertinent medical history, Discussed the surgical course, Reviewed Intra-OP anesthesia mangement and issues during anesthesia, Set expectations for post-procedure period and Allowed opportunity for questions and acknowledgement of understanding      Vitals:  Vitals Value Taken Time   /77    Temp 97.7    Pulse 70 10/03/23 0926   Resp 12 10/03/23 0926   SpO2 100 % 10/03/23 0926   Vitals shown include unvalidated device data.    Electronically Signed By: LYNSEY Leiva CRNA  October 3, 2023  9:27 AM

## 2023-10-03 NOTE — DISCHARGE SUMMARY
[facility] MEDICINE  DISCHARGE SUMMARY     Primary Care Physician: Suzi Marrufo  Admission Date: 10/2/2023   Discharge Provider: Clara Johnson MD Discharge Date: 10/3/2023   Diet: None      Code Status: Full Code   Activity: -per surgery         Condition at Discharge: Stable     REASON FOR PRESENTATION(See Admission Note for Details)   Abd pain    PRINCIPAL & ACTIVE DISCHARGE DIAGNOSES     Principal Problem:    Renal lesion  Active Problems:    Acute appendicitis, unspecified acute appendicitis type      PENDING LABS     Unresulted Labs Ordered in the Past 30 Days of this Admission       Date and Time Order Name Status Description    10/3/2023  9:06 AM Surgical Pathology Exam In process               PROCEDURES ( this hospitalization only)      Procedure(s):  APPENDECTOMY, LAPAROSCOPIC    RECOMMENDATIONS TO OUTPATIENT PROVIDER FOR F/U VISIT     Follow-up Appointments     Follow-up and recommended labs and tests       See your primary provider in 1 week--need to get kidney imaging set up to   check 8 mm lesion on right kidney  Follow up with surgery per their recommendations                DISPOSITION     Home    SUMMARY OF HOSPITAL COURSE:      Lolly Manzano is a 63F presents with acute abd pain, found to have appendicitis; pmhx includes diverticulitis, stess incontinence, MDD; admitted for acute appendicitis, pending appendectomy with ACS, anticipated 10/3.     1.Acute appendicitis  -s/p appy today  -did well   -I did see her in PACU-awake and has good BS already  -surg will send home today from surgery area  -activity and pain meds per surg     2.Kidney lesion  -8 mm right kidney  -I told her she needs follow up and will need ct or mri to check--she remembered from admit about this  -Urology consult was already placed     3.migraine  -at home takes topamax     4. HTN  -post op ok restart lisinopril     5.metabolic acidosis  -improved            Discharge  Medications with Med changes:     Current Discharge Medication List        START taking these medications    Details   oxyCODONE (ROXICODONE) 5 MG tablet Take 1 tablet (5 mg) by mouth every 4 hours as needed for moderate to severe pain  Qty: 6 tablet, Refills: 0    Associated Diagnoses: Acute appendicitis, unspecified acute appendicitis type           CONTINUE these medications which have NOT CHANGED    Details   CALCIUM + D OR unsure of dose daily    Associated Diagnoses: Wrist injury; Wrist sprain      estradiol (ESTRACE) 0.1 MG/GM vaginal cream Place 1 g vaginally as needed      fluticasone (FLONASE) 50 MCG/ACT nasal spray       glucosamine 500 MG CAPS capsule Take 2,000 mg by mouth      lisinopril (ZESTRIL) 10 MG tablet Take 1 tablet by mouth daily      MAGNESIUM CITRATE OR unsure of dose daily    Associated Diagnoses: Wrist injury; Wrist sprain      Multiple Vitamins-Minerals (MULTIVITAMIN ADULTS PO) Take 1 tablet by mouth daily      topiramate (TOPAMAX) 100 MG tablet Take 100 mg by mouth At Bedtime      ZOLMitriptan (ZOMIG) 2.5 MG tablet Take 1 tab(s) PO Once per day prn migraine  Qty: 20 tablet, Refills: 3    Associated Diagnoses: Migraine without aura and responsive to treatment      ORDER FOR DME R wrist splint  Qty: 1 , Refills: 0    Associated Diagnoses: Wrist sprain; Wrist injury                   Rationale for medication changes:      Oxycodone for post op pain per surgery        Consults       SURGERY GENERAL IP CONSULT    Immunizations given this encounter       There is no immunization history on file for this patient.        Anticoagulation Information      Recent INR results: No results for input(s): INR in the last 168 hours.  Warfarin doses (if applicable) or name of other anticoagulant: none      SIGNIFICANT IMAGING FINDINGS     Results for orders placed or performed during the hospital encounter of 10/02/23   CT Abdomen Pelvis w Contrast    Impression    IMPRESSION:   1.  Acute appendicitis. No  perforation or abscess.  2.  An 8 mm right renal lesion does not meet criteria for a simple cyst. Renal mass protocol CT or MRI is recommended for further evaluation.         SIGNIFICANT LABORATORY FINDINGS     Most Recent 3 CBC's:  Recent Labs   Lab Test 10/03/23  0657 10/02/23  1716 08/23/22  1139   WBC 8.2 14.4* 6.8   HGB 13.0 13.7 14.8   MCV 95 93 93    311 290     Most Recent 3 BMP's:  Recent Labs   Lab Test 10/03/23  0657 10/02/23  1716 08/23/22  1139    137 141   POTASSIUM 4.1 4.0 4.0   CHLORIDE 105 102 105   CO2 22 21* 27   BUN 21.0 23.2* 24*   CR 1.06* 0.98* 1.22*   ANIONGAP 11 14 9   DEVORA 8.8 10.4* 9.9   GLC 94 96 83     Most Recent 2 LFT's:  Recent Labs   Lab Test 10/02/23  1716 08/23/22  1139   AST 19 22   ALT 19  --    ALKPHOS 56  --    BILITOTAL 0.4  --        CT Abdomen Pelvis w Contrast    Result Date: 10/2/2023  EXAM: CT ABDOMEN PELVIS W CONTRAST LOCATION: [facility] DATE: 10/2/2023 INDICATION: RLQ pain COMPARISON: 07/08/2015 TECHNIQUE: CT scan of the abdomen and pelvis was performed following injection of IV contrast. Multiplanar reformats were obtained. Dose reduction techniques were used. CONTRAST: ISOVUE 370 75ML FINDINGS: LOWER CHEST: Small hiatal hernia. HEPATOBILIARY: Normal. PANCREAS: Normal. SPLEEN: Normal. ADRENAL GLANDS: Normal. KIDNEYS/BLADDER: A simple right renal cyst does not require follow-up. An 8 mm right renal lesion is 35 Hounsfield units.  No hydronephrosis or hydroureter. No ureteral calculi. Normal urinary bladder. BOWEL: Colonic diverticulosis. The appendix is dilated at 9 mm. No abscess or free air. LYMPH NODES: Normal. VASCULATURE: Atherosclerotic disease. PELVIC ORGANS: Hysterectomy. MUSCULOSKELETAL: Normal.     IMPRESSION: 1.  Acute appendicitis. No perforation or abscess. 2.  An 8 mm right renal lesion does not meet criteria for a simple cyst. Renal mass protocol CT or MRI is recommended for further evaluation.        Discharge Orders         Adult Urology  Referral      When to call - Contact Surgeon Team    You may experience symptoms that require follow-up before your scheduled appointment. Contact your Surgeon Team if you are concerned about pain control, large amount of bleeding, blood clots, constipation, or if you experience signs of infection (fever, growing tenderness at the surgery site, a large amount of drainage, severe pain, foul-smelling drainage, redness or swelling.     When to call - Reach out to Urgent Care    If you are experiencing uncontrolled Nausea and Vomiting, uncontrolled pain, inability to urinate and uncomfortable, and in need of immediate care, and you are NOT able to reach your Surgeon Team, go to an Urgent Care clinic. Do NOT go to the Emergency Room unless you have shortness of breath, chest pain, or other signs of a medical emergency.     When to call - Reasons to Call 911    Call 911 immediately if you experience sudden-onset chest pain, arm weakness/numbness, slurred speech, or shortness of breath     Symptoms - Fever Management    A low grade fever can be expected after surgery. Your Provider many have prescribed an Opioid pain medication that also contains acetaminophen (TYLENOL) that may help with Fever management.  Do NOT take additional acetaminophen (TYLENOL) in combination with an Opioid/acetaminophen (TYLENOL) product. Read the labels on your Over The Counter (OTC) medications with care.     Symptoms - Reduced Urine Output    If it has been greater than 8 hours since you have urinated despite drinking plenty of water, call your Surgeon Team.     No driving or operating machinery    Do NOT drive any vehicle or operate mechanical equipment for 24 hours following the end of your surgery.  Even though you may feel normal, your reactions may be affected by Anesthesia medication you received.     No Alcohol    Do NOT drink alcoholic beverages for 24 hours following your surgery and while taking pain  medications.     Discharge Instructions - Comfort and Pain Management    You will be called by the surgery nurses in a few days to check on your progress. If you are doing well, you do not need to come to a follow up appointment, but you may schedule an appointment if you would like to be seen. Call Dr. Barahona's office at [phone] with any questions or concerns.     PAIN:  Some pain is expected after surgery. This will improve over time. After laparoscopic surgery, some people experience shoulder pain on the first day after surgery. This is from the gas used to blow up the abdomen. This sensation usually improves within 48 hours. I recommend use Tylenol 1000 mg every 6 hours. Take the prescribed narcotic medications only if needed in addition to medications above.     ACTIVITIES:  Resume normal activities as tolerated. Avoid heavy lifting for 4 weeks.     WOUND CARE:  Skin glue was used as a dressing. This will peel off on its own in 2-3 weeks. You may shower after surgery. No baths, hot tubs, or swimming for a total of 2 weeks.     DIET:  Return to your regular diet. You may want to start with light foods immediately after surgery as the anesthesia can cause some nausea.     Discharge Instructions - Rest    Rest and relax for the next 24 hours. Make arrangements to have someone stay with you overnight, and avoid hazardous and strenuous activities.  Do NOT make any important decisions for the next 24 hours.     Reason for your hospital stay    Abd pain     Follow-up and recommended labs and tests     See your primary provider in 1 week--need to get kidney imaging set up to check 8 mm lesion on right kidney  Follow up with surgery per their recommendations     Activity    Your activity upon discharge: per surgery --see orders       Examination   Physical Exam   Temp:  [97.7  F (36.5  C)-98  F (36.7  C)] 97.7  F (36.5  C)  Pulse:  [54-74] 61  Resp:  [16-18] 17  BP: ()/(52-93) 159/83  SpO2:  [90 %-100 %]  100 %  Wt Readings from Last 1 Encounters:   10/02/23 73 kg (161 lb)     See today's note    Please see EMR for more detailed significant labs, imaging, consultant notes etc.    I, Clara Johnson MD, personally saw the patient today and spent greater than 30 minutes discharging this patient.    Clara Johnson MD  [facility]    CC:Suzi Marrufo

## 2023-10-03 NOTE — H&P
[facility]    History and Physical - Hospitalist Service       Date of Admission:  10/2/2023    Assessment & Plan      Lolly Manzano is a 63F presents with acute abd pain, found to have appendicitis; pmhx includes diverticulitis, stess incontinence, MDD; admitted for acute appendicitis, pending appendectomy with ACS, anticipated 10/3.    #acute appendicitis  Surgery evaluated, pending appendectomy on 10/3.  -tylenol PO PRN  -oxycodone/dilaudid PO/IV PRN  -zofran/compazine PO/IV PRN  -ciprofloxacin 500mg PO BID  -NPO (sips with meds)  -appendectomy appears scheduled for 10/3 @ 7am    #migraine  Multiple topamax orders present in med rec.  -HOLD topamax until med rec completed    #HTN  -HOLD lisinopril 10mg PO every day    #metabolic acidosis  Mild, likely related to acute appendicitis  -BMP trend    #radiologic findings  -R renal lesion 8mm         Diet: NPO per Anesthesia Guidelines for Procedure/Surgery Except for: Meds, Ice Chips    DVT Prophylaxis: Pneumatic Compression Devices  Adorno Catheter: Not present  Lines: None     Cardiac Monitoring: None  Code Status: Full Code    Clinically Significant Risk Factors Present on Admission           # Hypercalcemia: Highest Ca = 10.4 mg/dL in last 2 days, will monitor as appropriate          # Hypertension: Home medication list includes antihypertensive(s)                 Disposition Plan      Expected Discharge Date: 10/05/2023                  Dionisio Becerra MD  Hospitalist Service  [facility]  Securely message with [email or web address] (more info)  Text page via [employer] Paging/Directory     ______________________________________________________________________    Chief Complaint   Abdominal pain    History is obtained from the patient    History of Present Illness   Lolly Manzano is a 63F presents with acute abd pain, found to have appendicitis; pmhx includes diverticulitis, stess incontinence, MDD; admitted for acute  appendicitis, pending appendectomy with ACS, anticipated 10/3.    Was in usual state of health up until the morning of 10/2 at 9:30 AM.  Started to have right lower quadrant abdominal pain that was dull achy, progressed to sharp/crampy, up to 8/10, nonradiating.  Had nausea without vomiting soon thereafter abdominal pain.  No fever/chills, headache, chest pain/dyspnea.      Past Medical History    History reviewed. No pertinent past medical history.    Past Surgical History   History reviewed. No pertinent surgical history.    Prior to Admission Medications   Prior to Admission Medications   Prescriptions Last Dose Informant Patient Reported? Taking?   CALCIUM + D OR   Yes No   Sig: unsure of dose daily   MAGNESIUM CITRATE OR   Yes No   Sig: unsure of dose daily   ORDER FOR DME   No No   Sig: R wrist splint   ZOLMitriptan (ZOMIG) 2.5 MG tablet   No No   Sig: Take 1 tab(s) PO Once per day prn migraine   fluticasone (FLONASE) 50 MCG/ACT nasal spray   Yes No   glucosamine 500 MG CAPS capsule   Yes No   Sig: Take 2,000 mg by mouth   lisinopril (ZESTRIL) 10 MG tablet   Yes No   Sig: Take 1 tablet by mouth daily   topiramate (TOPAMAX) 100 MG tablet   No No   Sig: TAKE 1 TABLET AT BEDTIME   topiramate (TOPAMAX) 100 MG tablet   No No   Sig: Take 1 tablet (100 mg) by mouth At Bedtime   topiramate (TOPAMAX) 100 MG tablet   No No   Sig: TAKE 1 TABLET AT BEDTIME DUE FOR APPT WITH DR ALBERT      Facility-Administered Medications: None        Review of Systems    The 10 point Review of Systems is negative other than noted in the HPI or here.      Physical Exam   Vital Signs: Temp: 98  F (36.7  C) Temp src: Temporal BP: (!) 142/84 Pulse: 73   Resp: 17 SpO2: 96 %      Weight: 161 lbs 0 oz    Constitutional: awake, alert, cooperative, no apparent distress, and appears stated age  Respiratory: CTAB  Cardiovascular: RRR no m/g/r  GI: soft, nondistended, tenderness of RLQ, no guarding/rebound  Musculoskeletal: shoulder/elbow  flexion/extension 5/5 bilaterally and symmetric, ambulatory  Neurologic: AOx4, no focal deficits.    Medical Decision Making       40 MINUTES SPENT BY ME on the date of service doing chart review, history, exam, documentation & further activities per the note.  MANAGEMENT DISCUSSED with the following over the past 24 hours: patient   NOTE(S)/MEDICAL RECORDS REVIEWED over the past 24 hours: surgery consult note, outpatient IM, outpatient FM  Tests ORDERED & REVIEWED in the past 24 hours:  - See lab/imaging results included in the data section of the note      Data     I have personally reviewed the following data over the past 24 hrs:    14.4 (H)  \   13.7   / 311     137 102 23.2 (H) /  96   4.0 21 (L) 0.98 (H) \     ALT: 19 AST: 19 AP: 56 TBILI: 0.4   ALB: 4.6 TOT PROTEIN: 7.6 LIPASE: N/A       Imaging results reviewed over the past 24 hrs:   Recent Results (from the past 24 hour(s))   CT Abdomen Pelvis w Contrast    Narrative    EXAM: CT ABDOMEN PELVIS W CONTRAST  LOCATION: [facility]  DATE: 10/2/2023    INDICATION: RLQ pain  COMPARISON: 07/08/2015   TECHNIQUE: CT scan of the abdomen and pelvis was performed following injection of IV contrast. Multiplanar reformats were obtained. Dose reduction techniques were used.  CONTRAST: ISOVUE 370 75ML    FINDINGS:   LOWER CHEST: Small hiatal hernia.     HEPATOBILIARY: Normal.    PANCREAS: Normal.    SPLEEN: Normal.    ADRENAL GLANDS: Normal.    KIDNEYS/BLADDER: A simple right renal cyst does not require follow-up. An 8 mm right renal lesion is 35 Hounsfield units.  No hydronephrosis or hydroureter. No ureteral calculi. Normal urinary bladder.    BOWEL: Colonic diverticulosis. The appendix is dilated at 9 mm. No abscess or free air.     LYMPH NODES: Normal.    VASCULATURE: Atherosclerotic disease.     PELVIC ORGANS: Hysterectomy.     MUSCULOSKELETAL: Normal.      Impression    IMPRESSION:   1.  Acute appendicitis. No perforation or abscess.  2.  An  8 mm right renal lesion does not meet criteria for a simple cyst. Renal mass protocol CT or MRI is recommended for further evaluation.

## 2023-10-03 NOTE — MEDICATION SCRIBE - ADMISSION MEDICATION HISTORY
Medication Scribe Admission Medication History    Admission medication history is complete. The information provided in this note is only as accurate as the sources available at the time of the update.    Medication reconciliation/reorder completed by provider prior to medication history? No    Information Source(s): Patient via in-person    Pertinent Information:     Changes made to PTA medication list:  Added: Multivitamin (per patient)  Deleted: None  Changed: None    Medication Affordability:  Not including over the counter (OTC) medications, was there a time in the past 3 months when you did not take your medications as prescribed because of cost?: No    Allergies reviewed with patient and updates made in EHR: yes    Medication History Completed By: Giorgio Garcia 10/3/2023 1:34 AM    Prior to Admission medications    Medication Sig Last Dose Taking? Auth Provider Long Term End Date   CALCIUM + D OR unsure of dose daily 10/2/2023 at am Yes Reported, Patient     estradiol (ESTRACE) 0.1 MG/GM vaginal cream Place 1 g vaginally as needed Unknown at prn Yes Reported, Patient     fluticasone (FLONASE) 50 MCG/ACT nasal spray  Unknown at prn Yes Reported, Patient     glucosamine 500 MG CAPS capsule Take 2,000 mg by mouth 10/2/2023 at am Yes Reported, Patient     lisinopril (ZESTRIL) 10 MG tablet Take 1 tablet by mouth daily 10/1/2023 at hs Yes Reported, Patient Yes    MAGNESIUM CITRATE OR unsure of dose daily 10/2/2023 at am Yes Reported, Patient     Multiple Vitamins-Minerals (MULTIVITAMIN ADULTS PO) Take 1 tablet by mouth daily 10/2/2023 at am Yes Reported, Patient     topiramate (TOPAMAX) 100 MG tablet Take 100 mg by mouth At Bedtime 10/1/2023 at hs Yes Reported, Patient No    ZOLMitriptan (ZOMIG) 2.5 MG tablet Take 1 tab(s) PO Once per day prn migraine Unknown at prn Yes Manan Coulter MD Yes    ORDER FOR DME R wrist splint   Phillip Goodrich PA-C

## 2023-10-03 NOTE — OP NOTE
General Surgery Operative Note    Date of Surgery: 10/3/2023     Surgeon: Yayo Barahona MD    Assistant: None    Preoperative Diagnosis: Acute appendicitis    Postoperative Diagnosis: Acute appendicitis    Procedure: Laparoscopic appendectomy    EBL: 5 cc    Findings: Mildly inflamed appendix    Indications:  Patient is a 63-year-old woman who presented to the emergency department with approximately 4 hours of right lower quadrant abdominal pain.  Her work-up was consistent with acute appendicitis.  After discussion of the risk and benefits of laparoscopic appendectomy, the patient consented to the operation.    Details of operation:  The patient was brought to the operating room placed on the table in the supine position.  General anesthesia was induced without incident.  The patient was prepped and draped in the usual sterile fashion.  A timeout for safety was performed.    I began with a periumbilical incision and a Veress needle was introduced.  Pneumoperitoneum was established without incident.  A 5 mm optical port was placed at the site.  There was no injury with entry.  A 12 mm port was placed in the left lower quadrant and a 5 mm port was placed in the suprapubic area.  Appendix was identified in the right lower quadrant after placing the patient in Trendelenburg with the right side up.  There was evidence of acute inflammation of the tip and distal aspect.  I divided the distal aspect of the mesoappendix using electrocautery.  I then switched over and made a window between the mesoappendix and the appendiceal base.  A 45 mm blue load Endo ANH stapler was fired across the base of the appendix.  I then finished the transection of the mesoappendix close to the appendix itself using electrocautery.  Hemostasis was excellent.  Appendix was placed in Endo Catch bag and removed from the abdomen.  I then closed the 12 mm port site fascia using an 0 Vicryl suture on a suture passer device.  The remaining ports  were removed and insufflation was released.  The skin was closed with 4-0 Monocryl suture and skin glue was used as a dressing.  Patient tolerated the procedure well.  There were no immediately apparent complications.    Yayo Barahona MD  General Surgeon  [facility]  Surgery [address] [address]  Office: [phone]

## 2023-10-04 LAB
PATH REPORT.COMMENTS IMP SPEC: NORMAL
PATH REPORT.COMMENTS IMP SPEC: NORMAL
PATH REPORT.FINAL DX SPEC: NORMAL
PATH REPORT.GROSS SPEC: NORMAL
PATH REPORT.MICROSCOPIC SPEC OTHER STN: NORMAL
PATH REPORT.RELEVANT HX SPEC: NORMAL
PHOTO IMAGE: NORMAL

## 2023-10-09 ENCOUNTER — DOCUMENTATION ONLY (OUTPATIENT)
Dept: SURGERY | Facility: CLINIC | Age: 63
End: 2023-10-09
Payer: COMMERCIAL

## 2023-10-09 NOTE — PROGRESS NOTES
Federal Family and Medical Leave Act forms received, completed and signed on providers behalf.    Leave start date: 10/03/2023    Leave end date: 10/17/2023    RTW on 10/18/2023 with no restrictions.     Forms faxed to: [phone], attn: Britni Carrizales.     Forms emailed to: [email or web address]    Forms labeled and sent to HIM for scanning.    Nick Almaguer CMA  She/Her      [facility]  Surgery Clinic [facility]  Weight Management Clinic [address] [address]    Office: [phone]  Fax: [phone]

## 2023-10-12 ENCOUNTER — DOCUMENTATION ONLY (OUTPATIENT)
Dept: SURGERY | Facility: CLINIC | Age: 63
End: 2023-10-12
Payer: COMMERCIAL

## 2023-10-12 DIAGNOSIS — G43.009 MIGRAINE WITHOUT AURA AND RESPONSIVE TO TREATMENT: Primary | ICD-10-CM

## 2023-10-12 RX ORDER — TOPIRAMATE 100 MG/1
100 TABLET, FILM COATED ORAL AT BEDTIME
Qty: 14 TABLET | Refills: 0 | Status: SHIPPED | OUTPATIENT
Start: 2023-10-12 | End: 2023-10-23

## 2023-10-12 NOTE — TELEPHONE ENCOUNTER
Refill request for: topiramate 100mg  Directions: Take 1 tablet at bedtime    LOV: 08/14/23  NOV: No future appt scheduled. Pt canceled appt on 8/14/23. Letter has already been mailed to pt reminding her to schedule an appt ON 9/12/23.    14 day supply with 0 refills Medication T'd for review and signature    Dena Hanson LPN on 10/12/2023 at 11:19 AM

## 2023-10-12 NOTE — PROGRESS NOTES
Federal Family and Medical Leave Act forms received, completed and signed on providers behalf.    Leave start date: 10/03/2023    Leave end date: 10/31/2023    RTW on 10/31/2023 with no restrictions.     Forms faxed to: [phone], attn: Britni Carrizales.    Forms labeled and sent to HIM for scanning.      Nick Almaguer CMA  She/Her      [facility]  Surgery Clinic [facility]  Weight Management Clinic [address] [address]    Office: [phone]  Fax: [phone]

## 2023-10-18 ENCOUNTER — DOCUMENTATION ONLY (OUTPATIENT)
Dept: SURGERY | Facility: CLINIC | Age: 63
End: 2023-10-18
Payer: COMMERCIAL

## 2023-10-18 NOTE — PROGRESS NOTES
Federal Family and Medical Leave Act forms received, completed and signed on providers behalf.    Leave start date: 10/03/23    Leave end date: 10/31/23    RTW on 10/31/23 with no restrictions.     Forms faxed to: [phone], attn: Michelle.    Forms labeled and sent to HIM for scanning.    Nick Almaguer CMA  She/Her      [facility]  Surgery Clinic [facility]  Weight Management Clinic [address] [address]    Office: [phone]  Fax: [phone]

## 2023-10-20 NOTE — PROGRESS NOTES
__________________________________  ESTABLISHED PATIENT NEUROLOGY NOTE    DATE OF VISIT: 10/23/2023  MRN: [id number]  PATIENT NAME: Lolly Manzano  YOB: 1960    Chief Complaint   Patient presents with    Migraine     No concerns     SUBJECTIVE:                                                      HISTORY OF PRESENT ILLNESS:  Lolly is here for follow up regarding headache    Lolly Manzano is a 63 year old female with a history of paroxysmal vertigo, Lyme's disease, depression, hypertension, arthritis and vascular type headaches.  She follows with Dr. Coulter in the clinic, last seen 8/03/22.  Per chart review, migrainous type headaches have been ongoing since 2003 or before.  Headaches are bitemporal in nature.  Some phonophobia, photophobia noted.  No significant nausea or vomiting.  At her last appointment in August she was having 3-4 migraines per month.  Patient uses Imitrex to break her headaches but it causes a weird sensation so she cannot use it while at work.  She was switched over to Zomig, that seems to work better for her.  She is on Topamax 100 mg at bedtime as preventative therapy    10/23/23: Lolly presents to the clinic for her annual headache follow-up.  She describes her headaches as bitemporal throbbing pain.  Sometimes the pain is at the base of her head.  Associated symptoms include nausea and vomiting.  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  She said at that time she had a few days a month that she did not have a migraine.  Dr. Coulter noticed she was not doing well at work and asked if she was okay.  She stated that she is struggling with migraines and subsequently made an appointment for management of her pain in the clinic.  She states that she is taking Topamax 100 mg at bedtime.  She has had the start of a migraine 6-7 times in the last year.  These are quickly treated with Zomig within less than 1 hour.   "She is pleased with her current regimen.  She has no other concerns at this time.     Current Medications:   CALCIUM + D OR, unsure of dose daily  estradiol (ESTRACE) 0.1 MG/GM vaginal cream, Place 1 g vaginally as needed  fluticasone (FLONASE) 50 MCG/ACT nasal spray,   glucosamine 500 MG CAPS capsule, Take 2,000 mg by mouth  lisinopril (ZESTRIL) 10 MG tablet, Take 1 tablet by mouth daily  MAGNESIUM CITRATE OR, unsure of dose daily  Multiple Vitamins-Minerals (MULTIVITAMIN ADULTS PO), Take 1 tablet by mouth daily  ORDER FOR DME, R wrist splint  oxyCODONE (ROXICODONE) 5 MG tablet, Take 1 tablet (5 mg) by mouth every 4 hours as needed for moderate to severe pain    No current facility-administered medications on file prior to visit.    Past Medical History:   Patient  has a past medical history of PONV (postoperative nausea and vomiting).  Surgical History:  She  has a past surgical history that includes Laparoscopic appendectomy (N/A, 10/3/2023).  Family and Social History:  Reviewed, and she  reports that she has quit smoking. She has never used smokeless tobacco. She reports that she does not currently use alcohol. She reports that she does not use drugs.  Reviewed, and family history includes Alzheimer Disease in her father; Cerebrovascular Disease in her father; Depression in her sister; Hypertension in her mother; Leukemia in her brother; Myocardial Infarction in her mother; Seizure Disorder in her son.      RECENT DIAGNOSTIC STUDIES:   Labs:08/23/22  Topiramate  5.0 - 20.0 ug/mL 4.6 Low      Imaging: no recent     REVIEW OF SYSTEMS:                                                      10-point review of systems is negative except as mentioned above in HPI.    EXAM:                                                      Physical Exam:   Vitals: BP (!) 88/41   Pulse 55   Ht 1.651 m (5' 5\")   Wt 73 kg (161 lb)   BMI 26.79 kg/m    BMI= Body mass index is 26.79 kg/m .  GENERAL: NAD.  HEENT: NC/AT.  PULM: " Non-labored breathing.   Neurologic:  MENTAL STATUS: Alert, attentive. Speech is fluent. Normal comprehension. Normal concentration. Adequate fund of knowledge.   CRANIAL NERVES: Visual fields intact to confrontation. Pupils equally, round and reactive to light. Facial sensation and movement normal. EOM full. Hearing intact to conversation. Trapezius strength intact. Palate moves symmetrically. Tongue midline.  MOTOR: 5/5 in proximal and distal muscle groups of upper and lower extremities. Tone and bulk normal.   SENSATION: Normal light touch throughout.   COORDINATION: Normal finger nose finger. Finger tapping normal. Knee heel shin normal.  STATION AND GAIT: Romberg Negative. Good postural reflexes. Casual gait and tandem Normal  right hand-dominant.      ASSESSMENT and PLAN:                                                      Assessment:    ICD-10-CM    1. Migraine without aura and responsive to treatment  G43.009 ZOLMitriptan (ZOMIG) 2.5 MG tablet     topiramate (TOPAMAX) 100 MG tablet        Lolly Manzano is a 63 year old female with a history of paroxysmal vertigo, Lyme's disease, depression, hypertension, arthritis and vascular type headaches.  She follows with Dr. Coulter in the clinic, last seen 8/03/22. Patient has had a decrease in the frequency and severity of their headache on their current preventive therapy of Topamax 100 mg daily and abortive therapy of Zomig as needed.  She states she went from having more than 25 migraines a month down to 6 or 7 in a year that are effectively treated with her abortive treatment.  We discussed interventions outlined below we will plan to see the patient back in 12 months.  Lolly understands and agrees with this plan.    Plan:  --- Continue Preventive therapy: Topamax 100 mg at bedtime  --- Continue Abortive therapy: Zomig as needed  --- If migraines are not fully treated with your abortive therapy alone you can add an over the counter medication like  Aleve,Tylenol or Ibuprofen. Take at the same time as the first dose of your abortive therapy. Do not take over-the-counter medications more than 14 days a month to avoid rebound headaches.   --- Try Nonpharmacological interventions like heat or cold, massage, or rest  --- Practice good headache hygiene: Avoid known triggers, get adequate sleep, manage stress levels, stay hydrated and eat nutritious meals  --- Plan on follow up in the Neurology Clinic in 12 months.  --- Please feel free to reach out if you have any further questions or concerns.  --- Seek immediate medical attention if an emergency arises or if your health becomes progressively worse.     It was a pleasure meeting you today!     Total Time: Total time spent for face to face visit, reviewing labs/imaging studies, counseling and coordination of care was: 20 Minutes spent on the date of the encounter doing chart review, review of test results, patient visit, and documentation     This note was dictated using voice recognition software.  Any grammatical or context distortions are unintentional and inherent to the software.    Eusebia Pierson, DNP, APRN, CNP  [facility]

## 2023-10-23 ENCOUNTER — OFFICE VISIT (OUTPATIENT)
Dept: NEUROLOGY | Facility: CLINIC | Age: 63
End: 2023-10-23
Payer: COMMERCIAL

## 2023-10-23 VITALS
DIASTOLIC BLOOD PRESSURE: 62 MMHG | HEART RATE: 60 BPM | BODY MASS INDEX: 26.82 KG/M2 | HEIGHT: 65 IN | SYSTOLIC BLOOD PRESSURE: 117 MMHG | WEIGHT: 161 LBS

## 2023-10-23 DIAGNOSIS — G43.009 MIGRAINE WITHOUT AURA AND RESPONSIVE TO TREATMENT: ICD-10-CM

## 2023-10-23 PROCEDURE — 99213 OFFICE O/P EST LOW 20 MIN: CPT

## 2023-10-23 RX ORDER — ZOLMITRIPTAN 2.5 MG/1
TABLET, FILM COATED ORAL
Qty: 20 TABLET | Refills: 3 | Status: SHIPPED | OUTPATIENT
Start: 2023-10-23

## 2023-10-23 RX ORDER — TOPIRAMATE 100 MG/1
100 TABLET, FILM COATED ORAL AT BEDTIME
Qty: 90 TABLET | Refills: 3 | Status: SHIPPED | OUTPATIENT
Start: 2023-10-23

## 2023-10-23 NOTE — PATIENT INSTRUCTIONS
Plan:  --- Continue Preventive therapy: Topamax 100 mg at bedtime  --- Continue Abortive therapy: Zomig as needed  --- If migraines are not fully treated with your abortive therapy alone you can add an over the counter medication like Aleve,Tylenol or Ibuprofen. Take at the same time as the first dose of your abortive therapy. Do not take over-the-counter medications more than 14 days a month to avoid rebound headaches.   --- Try Nonpharmacological interventions like heat or cold, massage, or rest  --- Practice good headache hygiene: Avoid known triggers, get adequate sleep, manage stress levels, stay hydrated and eat nutritious meals  --- Plan on follow up in the Neurology Clinic in 12 months.  --- Please feel free to reach out if you have any further questions or concerns.  --- Seek immediate medical attention if an emergency arises or if your health becomes progressively worse.     It was a pleasure meeting you today!

## 2023-10-23 NOTE — LETTER
10/23/2023         RE: Lolly Manzano  [address] [place] [address]        Dear Colleague,    Thank you for referring your patient, Lolly Manzano, to the [facility] [place]. Please see a copy of my visit note below.      __________________________________  ESTABLISHED PATIENT NEUROLOGY NOTE    DATE OF VISIT: 10/23/2023  MRN: [id number]  PATIENT NAME: Lolly Manzano  YOB: 1960    Chief Complaint   Patient presents with     Migraine     No concerns     SUBJECTIVE:                                                      HISTORY OF PRESENT ILLNESS:  Lolly is here for follow up regarding headache    Lolly Manzano is a 63 year old female with a history of paroxysmal vertigo, Lyme's disease, depression, hypertension, arthritis and vascular type headaches.  She follows with Dr. Coulter in the clinic, last seen 8/03/22.  Per chart review, migrainous type headaches have been ongoing since 2003 or before.  Headaches are bitemporal in nature.  Some phonophobia, photophobia noted.  No significant nausea or vomiting.  At her last appointment in August she was having 3-4 migraines per month.  Patient uses Imitrex to break her headaches but it causes a weird sensation so she cannot use it while at work.  She was switched over to Zomig, that seems to work better for her.  She is on Topamax 100 mg at bedtime as preventative therapy    10/23/23: Lolly presents to the clinic for her annual headache follow-up.  She describes her headaches as bitemporal throbbing pain.  Sometimes the pain is at the base of her head.  Associated symptoms include nausea and vomiting.  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  She said at that time she had a few days a month that she did not have a migraine.  Dr. Coulter noticed she was not doing well at work and asked if she was okay.  She stated that she is struggling with  migraines and subsequently made an appointment for management of her pain in the clinic.  She states that she is taking Topamax 100 mg at bedtime.  She has had the start of a migraine 6-7 times in the last year.  These are quickly treated with Zomig within less than 1 hour.  She is pleased with her current regimen.  She has no other concerns at this time.     Current Medications:   CALCIUM + D OR, unsure of dose daily  estradiol (ESTRACE) 0.1 MG/GM vaginal cream, Place 1 g vaginally as needed  fluticasone (FLONASE) 50 MCG/ACT nasal spray,   glucosamine 500 MG CAPS capsule, Take 2,000 mg by mouth  lisinopril (ZESTRIL) 10 MG tablet, Take 1 tablet by mouth daily  MAGNESIUM CITRATE OR, unsure of dose daily  Multiple Vitamins-Minerals (MULTIVITAMIN ADULTS PO), Take 1 tablet by mouth daily  ORDER FOR DME, R wrist splint  oxyCODONE (ROXICODONE) 5 MG tablet, Take 1 tablet (5 mg) by mouth every 4 hours as needed for moderate to severe pain    No current facility-administered medications on file prior to visit.    Past Medical History:   Patient  has a past medical history of PONV (postoperative nausea and vomiting).  Surgical History:  She  has a past surgical history that includes Laparoscopic appendectomy (N/A, 10/3/2023).  Family and Social History:  Reviewed, and she  reports that she has quit smoking. She has never used smokeless tobacco. She reports that she does not currently use alcohol. She reports that she does not use drugs.  Reviewed, and family history includes Alzheimer Disease in her father; Cerebrovascular Disease in her father; Depression in her sister; Hypertension in her mother; Leukemia in her brother; Myocardial Infarction in her mother; Seizure Disorder in her son.      RECENT DIAGNOSTIC STUDIES:   Labs:08/23/22  Topiramate  5.0 - 20.0 ug/mL 4.6 Low      Imaging: no recent     REVIEW OF SYSTEMS:                                                      10-point review of systems is negative except as  "mentioned above in HPI.    EXAM:                                                      Physical Exam:   Vitals: BP (!) 88/41   Pulse 55   Ht 1.651 m (5' 5\")   Wt 73 kg (161 lb)   BMI 26.79 kg/m    BMI= Body mass index is 26.79 kg/m .  GENERAL: NAD.  HEENT: NC/AT.  PULM: Non-labored breathing.   Neurologic:  MENTAL STATUS: Alert, attentive. Speech is fluent. Normal comprehension. Normal concentration. Adequate fund of knowledge.   CRANIAL NERVES: Visual fields intact to confrontation. Pupils equally, round and reactive to light. Facial sensation and movement normal. EOM full. Hearing intact to conversation. Trapezius strength intact. Palate moves symmetrically. Tongue midline.  MOTOR: 5/5 in proximal and distal muscle groups of upper and lower extremities. Tone and bulk normal.   SENSATION: Normal light touch throughout.   COORDINATION: Normal finger nose finger. Finger tapping normal. Knee heel shin normal.  STATION AND GAIT: Romberg Negative. Good postural reflexes. Casual gait and tandem Normal  right hand-dominant.      ASSESSMENT and PLAN:                                                      Assessment:    ICD-10-CM    1. Migraine without aura and responsive to treatment  G43.009 ZOLMitriptan (ZOMIG) 2.5 MG tablet     topiramate (TOPAMAX) 100 MG tablet        Lolly Manzano is a 63 year old female with a history of paroxysmal vertigo, Lyme's disease, depression, hypertension, arthritis and vascular type headaches.  She follows with Dr. Coulter in the clinic, last seen 8/03/22. Patient has had a decrease in the frequency and severity of their headache on their current preventive therapy of Topamax 100 mg daily and abortive therapy of Zomig as needed.  She states she went from having more than 25 migraines a month down to 6 or 7 in a year that are effectively treated with her abortive treatment.  We discussed interventions outlined below we will plan to see the patient back in 12 months.  Lolly understands " and agrees with this plan.    Plan:  --- Continue Preventive therapy: Topamax 100 mg at bedtime  --- Continue Abortive therapy: Zomig as needed  --- If migraines are not fully treated with your abortive therapy alone you can add an over the counter medication like Aleve,Tylenol or Ibuprofen. Take at the same time as the first dose of your abortive therapy. Do not take over-the-counter medications more than 14 days a month to avoid rebound headaches.   --- Try Nonpharmacological interventions like heat or cold, massage, or rest  --- Practice good headache hygiene: Avoid known triggers, get adequate sleep, manage stress levels, stay hydrated and eat nutritious meals  --- Plan on follow up in the Neurology Clinic in 12 months.  --- Please feel free to reach out if you have any further questions or concerns.  --- Seek immediate medical attention if an emergency arises or if your health becomes progressively worse.     It was a pleasure meeting you today!     Total Time: Total time spent for face to face visit, reviewing labs/imaging studies, counseling and coordination of care was: 20 Minutes spent on the date of the encounter doing chart review, review of test results, patient visit, and documentation     This note was dictated using voice recognition software.  Any grammatical or context distortions are unintentional and inherent to the software.    Eusebia Pierson, DULCE, APRN, CNP  [facility]           Again, thank you for allowing me to participate in the care of your patient.        Sincerely,        LYNSEY Suarez CNP

## 2023-10-23 NOTE — NURSING NOTE
Chief Complaint   Patient presents with    Migraine     No concerns     Amber Leiva on 10/23/2023 at 8:24 AM

## 2023-10-26 ENCOUNTER — TELEPHONE (OUTPATIENT)
Dept: SURGERY | Facility: CLINIC | Age: 63
End: 2023-10-26
Payer: COMMERCIAL

## 2023-10-26 NOTE — TELEPHONE ENCOUNTER
Needs a letter stating she can return to work Nov 1st with no restrictions.  Please fax to her employer [phone] to the attn: Britni Carrizales    Thanks

## 2023-10-26 NOTE — LETTER
[facility] [address]  [facility] [address]  Phone: [phone]  Fax: [phone]    October 26, 2023        Lolly Manzano  [address]  [facility] [address]          To whom it may concern:    RE: Lolly Manzano    It is my medical opinion that Lolly may return to work full-time with no restrictions on 11/1/2023.     Please contact me for questions or concerns.      Sincerely,        Yayo Barahona MD

## 2023-10-26 NOTE — TELEPHONE ENCOUNTER
Return to work letter faxed to number patient provided. Patient notified.       [facility] Hospital[facility]ap[facility] Loyd RN  [facility]  General Surgery  [address] [address]  [email or web address]   Office:[phone]  Employed by [employer],

## 2023-11-25 ENCOUNTER — HEALTH MAINTENANCE LETTER (OUTPATIENT)
Age: 63
End: 2023-11-25

## 2024-01-15 ENCOUNTER — TELEPHONE (OUTPATIENT)
Dept: NEUROLOGY | Facility: CLINIC | Age: 64
End: 2024-01-15
Payer: COMMERCIAL

## 2024-01-15 NOTE — TELEPHONE ENCOUNTER
Per chart review.  Pt was to get CT through Urology, not neurology.     LDVM and advised pt to call her Urology clinic to discuss further.     Vandana DONATO RN, BSN  [facility]

## 2024-01-15 NOTE — TELEPHONE ENCOUNTER
[facility] Call Center    Phone Message    May a detailed message be left on voicemail: yes     Reason for Call: Other: Pt is requesting a call back in regards to her CT scan. Pt states she has some questions. Please call and advise Pt at [phone]    Action Taken: Message routed to:  Other: [facility]     Travel Screening: Not Applicable

## 2024-11-07 DIAGNOSIS — G43.009 MIGRAINE WITHOUT AURA AND RESPONSIVE TO TREATMENT: ICD-10-CM

## 2024-11-07 RX ORDER — TOPIRAMATE 100 MG/1
100 TABLET, FILM COATED ORAL AT BEDTIME
Qty: 90 TABLET | Refills: 1 | Status: SHIPPED | OUTPATIENT
Start: 2024-11-07

## 2024-11-07 NOTE — TELEPHONE ENCOUNTER
[facility] Call Center    Phone Message    May a detailed message be left on voicemail: yes     Reason for Call: Medication Refill Request    Has the patient contacted the pharmacy for the refill? Yes   Name of medication being requested: Topamax 100 mg.   Provider who prescribed the medication: SOURAV DELCID  Pharmacy: [facility] - [place], [place]  Date medication is needed: ASAP   Patient out of medication  Action Taken: [facility]    Travel Screening: Not Applicable     Date of Service:

## 2024-11-07 NOTE — TELEPHONE ENCOUNTER
Refill request for: topiramate (TOPAMAX) 100 MG tablet    Directions: Take 1 tablet (100 mg) by mouth at bedtime     LOV: 10/23/23  NOV: 2/24/25    90 day supply with 1 refills Medication T'd for review and signature    Kelly Moran MA

## 2024-11-14 ENCOUNTER — TELEPHONE (OUTPATIENT)
Dept: NEUROLOGY | Facility: CLINIC | Age: 64
End: 2024-11-14
Payer: COMMERCIAL

## 2024-11-14 NOTE — TELEPHONE ENCOUNTER
[facility] Call Center    Phone Message    May a detailed message be left on voicemail: yes     Reason for Call: Form or Letter   Type or form/letter needing completion: Pt is requesting a letter for her employer. Pt states she currently takes topiramate (TOPAMAX) 100 MG tablet and it causes her to sleep heavy. 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    Please contact Pt to [facility] further at [phone]  Provider: Dr. Coulter   Date form needed: Pt requests as soon as possible   Once completed: Please confirm with Pt when discussing with Pt     Action Taken: Message routed to:  Other: [facility]     Travel Screening: Not Applicable

## 2024-11-18 NOTE — TELEPHONE ENCOUNTER
[employer] message sent to patient stating the letter is available for her viewing.    Kelly Moran MA

## 2024-11-19 NOTE — TELEPHONE ENCOUNTER
[facility] Call Center    Phone Message    May a detailed message be left on voicemail: yes     Reason for Call: Pt says she isn't using MyChart, she is asking for the letter to be mailed to her. Please call Lolly at [phone] to discuss further.      Action Taken: Message routed to:  Other: [facility]    Travel Screening: Not Applicable     Date of Service:

## 2025-01-04 ENCOUNTER — HEALTH MAINTENANCE LETTER (OUTPATIENT)
Age: 65
End: 2025-01-04

## 2025-02-20 NOTE — PROGRESS NOTES
In person evaluation      HPI  2/24/2020, in person evaluation  8/23/2021, no-show  8/3/2022, in person visit  8/14/2023, canceled visit  2/24/2025, in person visit        64-year-old followed neurologically for:  Migraine headaches  Abortive and preventative medication    Since last seen August 2022 (2.5 years ago)  Patient saw a nurse practitioner 10/2023    No hospitalizations  No surgeries  No major illnesses    Has had about 5 migraines in the last year  Medication does stop it from being so bad    She takes the preventative medication Topamax 100 mg once at nighttime  Zomig 2.5 mg used early in the headache works well  She knows that she can combine this with Tylenol/Aleve/ibuprofen to help make it better any one of the 3    She knows the side effects of topiramate including but not limited to kidney stones paresthesias weight loss and glaucoma    She stays well-hydrated    She has had difficulty with weight and she is on a protein diet    Letter dictated 11/15/2024  Patient has seen nurse practitioner October 2023  Patient on topiramate 100 mg at bedtime for migraines  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    2/20/2025 patient is currently now in between jobs      A.  Migraine headaches       Onset prior to 2003                                 2020                  8/3/2022            2/2025       Frequency   0-2/month             3-4/month         5/year         Duration        all day                   1/2 day              1 hour                                                                                   Zomig         usually has to sleep them off in the past         Associated symptoms       Photophobia       Some phonophobia       Headaches bitemporal in nature       Previously increased with oral contraceptives in  "the past       No significant nausea or vomiting         hard to take Imitrex during the day so we switched her to Zomig       Zomig 2.5 mg tablet tolerated well during the day but not as good at night         Will use 2 of the Zomig tablets if she has a headache at night          Neurologic history review       1. Migrainous type headaches going on for quite some time.       2. Had difficulty with weight gain with medications, so limited some of the preventatives.       3. Can have poor sleep and awakening, and with stressors can have increased headaches.       4. Does use Imitrex to break her headaches but it causes a weird sensation so she cannot use it when she is at work.            We switched  to Zomig, that works better.        B.  Paroxysmal vertigo        Worked up by ENT        History of sinus infection        History of Lyme's disease        History of hearing aids chronic hearing loss no tinnitus          has difficulty with vertigo when she gets up quickly or rolls over in bed quick lasting maybe 5 seconds        her  sometimes say that her eyes jiggle        she has had cannula 3 positioning in the past but only last for short time        sometimes will get motion sickness when she is on a boat but not all the time          Suspect that this is more inner ear type dysfunction but being on chronic meclizine may not be helpful could use it more as a as needed        discussed with patient      C.  Past history of some depression        In the past not on any antidepressants              Current past neurologic review:  1. Vascular type headaches going on for quite some time, even previously to 2003.  2. History of paroxysmal vertigo, worked up by ENT, had sinus infections and \"Lyme s disease\" and has worn hearing aids.  3. Had an episode back in 2001, was severely dehydrated, passed out in a canoe, had a seizure, taken to [facility], treated with fluids, had low blood pressure.  4. " History of depression, not on any antidepressants.  5. History of arthritis and hypertension.    In the past, she has tried some:  a. Neurontin in  as high as 900 mg.  b. Tried nortriptyline 30 mg at night, worked for the headaches in , but had weight gain.  c. Switched to Topamax around , now on 100 mg once at nighttime seems to help.  d. Cannot use nonsteroidals antiinflammatories as she had a bleeding ulcer in the past.  e. In the past, had an MRI scan in  that showed some nonspecific T2 signal changes.    Past medical history  Migraine headaches  Vertigo  Lyme's disease   Depression  Syncope 2012  Diverticulitis 2015    Habits  Non-smoker  Rare alcohol use    Family history  Mother with diverticulitis,  of ruptured colon/ at age 75  Father with stroke hemorrhagic/pneumonia  at age 83 also had some Alzheimer's  Sister with hypertension  Brother with hypertension  Brother with Hodgkin's lymphoma  Brother with colon cancer  Paternal grandmother with breast cancer  Son with mental retardation/epilepsy      Work-up  MRI scan brain 2001  A.  Multiple white matter changes bilaterally nonspecific question myelination  B.  No cerebellar pontine angle lesion  Past equivocal Lyme's titer Western blot negative no  CSF , negative for inflammation  EMG upper extremity   A.  Right CTS moderate  B.  Left CTS moderate    Laboratory review                    2018           3/5/2021      2022     10/2023  Topamax                                                  4.6    Na/K            142/3.7            138/4.2       141/4.0          138/4.1  Cl/CO2                                106/25        105/27           105/22  BUN/Cr         26/1.2             30/1.12       24/1.22          21.0/1.06  Glu                                       108               83                94  AST                                     25                 22                 19  WBC/Hgb                         6.9/13.9         6.8/14.8           8.2/13.0  Plts                                  320,000         290,000           265,000  TSH                                                                               1.24            Exam    Review of systems pertinent positives and negatives  Headaches as above    Vertigo as above  Chronic hearing loss no tinnitus    No diplopia dysarthria dysphagia  No focal weakness numbness or tingling  No visual field cut    No chest pain no shortness of breath  No nausea vomiting  No diarrhea fever chills    Otherwise review of systems negative        General exam  Blood pressure 124/75, pulse 59  HEENT exam is normal except hearing loss which is chronic  Lungs clear  Heart rate regular  Abdomen soft  Symmetrical pulses   No edema in the feet    Neurologic exam  Alert oriented x3  Normal prosody speech  Normal naming  Normal comprehension  Normal repetition  No aphasia  No neglect  Memory recall good      Cranials 2 through 12  Pupils equal round reactive to light  Optic fundi look okay  No ophthalmoplegia  No nystagmus  Visual fields intact  Face symmetrical  Twisters good  Chronic hearing loss    Upper extremities  No drift  No tremor   Normal finger-nose    Lower extremities  Distal proximal strength good    Reflexes  Symmetrical decreased at the ankles but symmetrical  toes downgoing    Gait  Normal  Tandem normal  Romberg negative      Assessment/Plan     1.  Migraine (G43.709)        Continue Topamax 100 mg nightly        Zomig as a abortive therapy 2.5 mg tablet better tolerated than the Imitrex, can take 2 tablets at nighttime to help break more severe headache       Good headache hygiene rediscussed proper sleep cycle hydration    Discussed risk factors of medication including but not limited to kidney stones cognitive difficulties paresthesias glaucoma    2.  Vertigo (R42)        reviewed difficulty with inner ear dysfunction with relatively persistent low-grade vertigo  with movement       Does not get lasting relief with position/cannula treatment       Could use meclizine as needed        3.  CTS       Bilateral EMG 2003 moderate in degree    Current plan:    1. Migrainous type headaches going on at least since 2003.  2. Better with Topamax 100 mg once at nighttime.  3. Knows the difference between preventative and abortive therapies, we discussed triptans, switched to Zomig.  4. Use Zomig 2.5 mg tablet, she can use two tablets if she is at home, one tablet if she is at work and see if she can use that without causing difficulty.  5. Knows the risks and side effects of Topamax including but not limited to kidney stones, paresthesias, cognitive difficulties, birth defects.  6. Talked about good headache hygiene.        Refilled Zomig  Refilled Topamax    Check  Electrolytes  AST  CBC  Topamax level    Follow-up on a yearly basis    Total care time today 30 minutes  The longitudinal plan of care for the diagnosis(es)/condition(s) as documented were addressed during this visit. Due to the added complexity in care, I will continue to support Lolly in the subsequent management and with ongoing continuity of care.

## 2025-02-24 ENCOUNTER — OFFICE VISIT (OUTPATIENT)
Dept: NEUROLOGY | Facility: CLINIC | Age: 65
End: 2025-02-24
Payer: COMMERCIAL

## 2025-02-24 VITALS
SYSTOLIC BLOOD PRESSURE: 124 MMHG | HEIGHT: 65 IN | BODY MASS INDEX: 27.32 KG/M2 | WEIGHT: 164 LBS | DIASTOLIC BLOOD PRESSURE: 75 MMHG | HEART RATE: 59 BPM

## 2025-02-24 DIAGNOSIS — G43.009 MIGRAINE WITHOUT AURA AND RESPONSIVE TO TREATMENT: Primary | ICD-10-CM

## 2025-02-24 PROCEDURE — 99214 OFFICE O/P EST MOD 30 MIN: CPT | Performed by: PSYCHIATRY & NEUROLOGY

## 2025-02-24 PROCEDURE — G2211 COMPLEX E/M VISIT ADD ON: HCPCS | Performed by: PSYCHIATRY & NEUROLOGY

## 2025-02-24 RX ORDER — ZOLMITRIPTAN 2.5 MG/1
TABLET, FILM COATED ORAL
Qty: 20 TABLET | Refills: 3 | Status: SHIPPED | OUTPATIENT
Start: 2025-02-24

## 2025-02-24 RX ORDER — TOPIRAMATE 100 MG/1
100 TABLET, FILM COATED ORAL AT BEDTIME
Qty: 90 TABLET | Refills: 3 | Status: SHIPPED | OUTPATIENT
Start: 2025-02-24

## 2025-02-24 NOTE — LETTER
2/24/2025      Lolly Manzano  [address] [place] [address]      Dear Colleague,    Thank you for referring your patient, Lolly Manzano, to the [facility] [place]. Please see a copy of my visit note below.    In person evaluation      HPI  2/24/2020, in person evaluation  8/23/2021, no-show  8/3/2022, in person visit  8/14/2023, canceled visit  2/24/2025, in person visit        64-year-old followed neurologically for:  Migraine headaches  Abortive and preventative medication    Since last seen August 2022 (2.5 years ago)  Patient saw a nurse practitioner 10/2023    No hospitalizations  No surgeries  No major illnesses    Has had about 5 migraines in the last year  Medication does stop it from being so bad    She takes the preventative medication Topamax 100 mg once at nighttime  Zomig 2.5 mg used early in the headache works well  She knows that she can combine this with Tylenol/Aleve/ibuprofen to help make it better any one of the 3    She knows the side effects of topiramate including but not limited to kidney stones paresthesias weight loss and glaucoma    She stays well-hydrated    She has had difficulty with weight and she is on a protein diet    Letter dictated 11/15/2024  Patient has seen nurse practitioner October 2023  Patient on topiramate 100 mg at bedtime for migraines  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    2/20/2025 patient is currently now in between jobs      A.  Migraine headaches       Onset prior to 2003                                 2020                  8/3/2022            2/2025       Frequency   0-2/month             3-4/month         5/year         Duration        all day                   1/2 day              1 hour                                                                                    Zomig         usually has to sleep them off in the past         Associated symptoms       Photophobia       Some phonophobia       Headaches bitemporal in nature       Previously increased with oral contraceptives in the past       No significant nausea or vomiting         hard to take Imitrex during the day so we switched her to Zomig       Zomig 2.5 mg tablet tolerated well during the day but not as good at night         Will use 2 of the Zomig tablets if she has a headache at night          Neurologic history review       1. Migrainous type headaches going on for quite some time.       2. Had difficulty with weight gain with medications, so limited some of the preventatives.       3. Can have poor sleep and awakening, and with stressors can have increased headaches.       4. Does use Imitrex to break her headaches but it causes a weird sensation so she cannot use it when she is at work.            We switched  to Zomig, that works better.        B.  Paroxysmal vertigo        Worked up by ENT        History of sinus infection        History of Lyme's disease        History of hearing aids chronic hearing loss no tinnitus          has difficulty with vertigo when she gets up quickly or rolls over in bed quick lasting maybe 5 seconds        her  sometimes say that her eyes jiggle        she has had cannula 3 positioning in the past but only last for short time        sometimes will get motion sickness when she is on a boat but not all the time          Suspect that this is more inner ear type dysfunction but being on chronic meclizine may not be helpful could use it more as a as needed        discussed with patient      C.  Past history of some depression        In the past not on any antidepressants              Current past neurologic review:  1. Vascular type headaches going on for quite some time, even previously to 2003.  2. History of paroxysmal vertigo, worked up  "by ENT, had sinus infections and \"Lyme s disease\" and has worn hearing aids.  3. Had an episode back in , was severely dehydrated, passed out in a canoe, had a seizure, taken to [facility], treated with fluids, had low blood pressure.  4. History of depression, not on any antidepressants.  5. History of arthritis and hypertension.    In the past, she has tried some:  a. Neurontin in  as high as 900 mg.  b. Tried nortriptyline 30 mg at night, worked for the headaches in , but had weight gain.  c. Switched to Topamax around , now on 100 mg once at nighttime seems to help.  d. Cannot use nonsteroidals antiinflammatories as she had a bleeding ulcer in the past.  e. In the past, had an MRI scan in  that showed some nonspecific T2 signal changes.    Past medical history  Migraine headaches  Vertigo  Lyme's disease   Depression  Syncope   Diverticulitis 2015    Habits  Non-smoker  Rare alcohol use    Family history  Mother with diverticulitis,  of ruptured colon/ at age 75  Father with stroke hemorrhagic/pneumonia  at age 83 also had some Alzheimer's  Sister with hypertension  Brother with hypertension  Brother with Hodgkin's lymphoma  Brother with colon cancer  Paternal grandmother with breast cancer  Son with mental retardation/epilepsy      Work-up  MRI scan brain 2001  A.  Multiple white matter changes bilaterally nonspecific question myelination  B.  No cerebellar pontine angle lesion  Past equivocal Lyme's titer Western blot negative no  CSF , negative for inflammation  EMG upper extremity   A.  Right CTS moderate  B.  Left CTS moderate    Laboratory review                    2018           3/5/2021      2022     10/2023  Topamax                                                  4.6    Na/K            142/3.7            138/4.2       141/4.0          138/4.1  Cl/CO2                                106/25        105/27           105/22  BUN/Cr         " 26/1.2             30/1.12       24/1.22          21.0/1.06  Glu                                       108               83                94  AST                                     25                 22                 19  WBC/Hgb                        6.9/13.9         6.8/14.8           8.2/13.0  Plts                                  320,000         290,000           265,000  TSH                                                                               1.24            Exam    Review of systems pertinent positives and negatives  Headaches as above    Vertigo as above  Chronic hearing loss no tinnitus    No diplopia dysarthria dysphagia  No focal weakness numbness or tingling  No visual field cut    No chest pain no shortness of breath  No nausea vomiting  No diarrhea fever chills    Otherwise review of systems negative        General exam  Blood pressure 124/75, pulse 59  HEENT exam is normal except hearing loss which is chronic  Lungs clear  Heart rate regular  Abdomen soft  Symmetrical pulses   No edema in the feet    Neurologic exam  Alert oriented x3  Normal prosody speech  Normal naming  Normal comprehension  Normal repetition  No aphasia  No neglect  Memory recall good      Cranials 2 through 12  Pupils equal round reactive to light  Optic fundi look okay  No ophthalmoplegia  No nystagmus  Visual fields intact  Face symmetrical  Twisters good  Chronic hearing loss    Upper extremities  No drift  No tremor   Normal finger-nose    Lower extremities  Distal proximal strength good    Reflexes  Symmetrical decreased at the ankles but symmetrical  toes downgoing    Gait  Normal  Tandem normal  Romberg negative      Assessment/Plan     1.  Migraine (G43.709)        Continue Topamax 100 mg nightly        Zomig as a abortive therapy 2.5 mg tablet better tolerated than the Imitrex, can take 2 tablets at nighttime to help break more severe headache       Good headache hygiene rediscussed proper sleep cycle  hydration    Discussed risk factors of medication including but not limited to kidney stones cognitive difficulties paresthesias glaucoma    2.  Vertigo (R42)        reviewed difficulty with inner ear dysfunction with relatively persistent low-grade vertigo with movement       Does not get lasting relief with position/cannula treatment       Could use meclizine as needed        3.  CTS       Bilateral EMG 2003 moderate in degree    Current plan:    1. Migrainous type headaches going on at least since 2003.  2. Better with Topamax 100 mg once at nighttime.  3. Knows the difference between preventative and abortive therapies, we discussed triptans, switched to Zomig.  4. Use Zomig 2.5 mg tablet, she can use two tablets if she is at home, one tablet if she is at work and see if she can use that without causing difficulty.  5. Knows the risks and side effects of Topamax including but not limited to kidney stones, paresthesias, cognitive difficulties, birth defects.  6. Talked about good headache hygiene.        Refilled Zomig  Refilled Topamax    Check  Electrolytes  AST  CBC  Topamax level    Follow-up on a yearly basis    Total care time today 30 minutes  The longitudinal plan of care for the diagnosis(es)/condition(s) as documented were addressed during this visit. Due to the added complexity in care, I will continue to support Lolly in the subsequent management and with ongoing continuity of care.                    Again, thank you for allowing me to participate in the care of your patient.        Sincerely,        florentin Coulter MD    Electronically signed

## 2025-02-24 NOTE — NURSING NOTE
Chief Complaint   Patient presents with    Migraine     Pt states she has about 5 in the past year. She takes her medication and that stops it from getting bad.     Dena Hanson LPN on 2/24/2025 at 3:26 PM

## 2025-03-20 ENCOUNTER — LAB REQUISITION (OUTPATIENT)
Dept: LAB | Facility: CLINIC | Age: 65
End: 2025-03-20

## 2025-03-20 DIAGNOSIS — R39.9 UNSPECIFIED SYMPTOMS AND SIGNS INVOLVING THE GENITOURINARY SYSTEM: ICD-10-CM

## 2025-03-20 PROCEDURE — 87186 SC STD MICRODIL/AGAR DIL: CPT | Performed by: OBSTETRICS & GYNECOLOGY

## 2025-03-20 PROCEDURE — 87086 URINE CULTURE/COLONY COUNT: CPT | Performed by: OBSTETRICS & GYNECOLOGY

## 2025-03-21 LAB — BACTERIA UR CULT: ABNORMAL

## (undated) DEVICE — CUSTOM PACK LAP CHOLE SBA5BLCHEA

## (undated) DEVICE — PLATE GROUNDING ADULT W/CORD 9165L

## (undated) DEVICE — ENDO TROCAR FIRST ENTRY KII FIOS Z-THRD 05X100MM CTF03

## (undated) DEVICE — DECANTER VIAL 2006S

## (undated) DEVICE — SU DERMABOND ADVANCED .7ML DNX12

## (undated) DEVICE — SU VICRYL+ 0 27 UR6 VLT VCP603H

## (undated) DEVICE — PREP CHLORAPREP 26ML TINTED HI-LITE ORANGE 930815

## (undated) DEVICE — SUTURE MONOCRYL+ 4-0 PS-2 27IN MCP426H

## (undated) DEVICE — ENDO TROCAR FIRST ENTRY KII FIOS Z-THRD 12X100MM CTF73

## (undated) DEVICE — Device

## (undated) DEVICE — SOL NACL 0.9% IRRIG 1000ML BOTTLE 2F7124

## (undated) DEVICE — GLOVE BIOGEL PI ULTRATOUCH G SZ 7.5 42175

## (undated) DEVICE — NDL INSUFFLATION 13GA 120MM C2201

## (undated) DEVICE — GLOVE BIOGEL PI INDICATOR 8.0 LF 41680

## (undated) DEVICE — TUBING SMOKE EVAC PNEUMOCLEAR HIGH FLOW 0620050250

## (undated) DEVICE — STPL ENDO LINEAR CUT ARTICULATING 45MM ATS45

## (undated) DEVICE — SOL WATER IRRIG 1000ML BOTTLE 2F7114

## (undated) DEVICE — SUCTION MANIFOLD NEPTUNE 2 SYS 1 PORT 702-025-000

## (undated) DEVICE — STPL ENDO RELOAD 45X3.5MM 6R45B

## (undated) DEVICE — ENDO TROCAR SLEEVE KII Z-THREADED 05X100MM CTS02

## (undated) RX ORDER — ONDANSETRON 2 MG/ML
INJECTION INTRAMUSCULAR; INTRAVENOUS
Status: DISPENSED
Start: 2023-10-03

## (undated) RX ORDER — PROPOFOL 10 MG/ML
INJECTION, EMULSION INTRAVENOUS
Status: DISPENSED
Start: 2023-10-03

## (undated) RX ORDER — GLYCOPYRROLATE 0.2 MG/ML
INJECTION, SOLUTION INTRAMUSCULAR; INTRAVENOUS
Status: DISPENSED
Start: 2023-10-03

## (undated) RX ORDER — BUPIVACAINE HYDROCHLORIDE 2.5 MG/ML
INJECTION, SOLUTION INFILTRATION; PERINEURAL
Status: DISPENSED
Start: 2023-10-03

## (undated) RX ORDER — DEXAMETHASONE SODIUM PHOSPHATE 10 MG/ML
INJECTION, SOLUTION INTRAMUSCULAR; INTRAVENOUS
Status: DISPENSED
Start: 2023-10-03

## (undated) RX ORDER — LIDOCAINE HYDROCHLORIDE 10 MG/ML
INJECTION, SOLUTION EPIDURAL; INFILTRATION; INTRACAUDAL; PERINEURAL
Status: DISPENSED
Start: 2023-10-03

## (undated) RX ORDER — CEFAZOLIN SODIUM 1 G/3ML
INJECTION, POWDER, FOR SOLUTION INTRAMUSCULAR; INTRAVENOUS
Status: DISPENSED
Start: 2023-10-03

## (undated) RX ORDER — FENTANYL CITRATE 50 UG/ML
INJECTION, SOLUTION INTRAMUSCULAR; INTRAVENOUS
Status: DISPENSED
Start: 2023-10-03